# Patient Record
Sex: FEMALE | Race: WHITE | NOT HISPANIC OR LATINO | Employment: OTHER | URBAN - METROPOLITAN AREA
[De-identification: names, ages, dates, MRNs, and addresses within clinical notes are randomized per-mention and may not be internally consistent; named-entity substitution may affect disease eponyms.]

---

## 2019-02-13 ENCOUNTER — HOSPITAL ENCOUNTER (INPATIENT)
Facility: HOSPITAL | Age: 79
LOS: 3 days | Discharge: HOME | DRG: 920 | End: 2019-02-16
Attending: EMERGENCY MEDICINE | Admitting: HOSPITALIST
Payer: MEDICARE

## 2019-02-13 DIAGNOSIS — I48.91 ATRIAL FIBRILLATION: ICD-10-CM

## 2019-02-13 DIAGNOSIS — K92.2 GI BLEED: ICD-10-CM

## 2019-02-13 DIAGNOSIS — K92.2 ACUTE LOWER GI BLEEDING: Primary | ICD-10-CM

## 2019-02-13 DIAGNOSIS — K92.2 LOWER GI BLEED: ICD-10-CM

## 2019-02-13 PROBLEM — I25.10 CAD (CORONARY ARTERY DISEASE): Status: ACTIVE | Noted: 2019-02-13

## 2019-02-13 LAB
ABO + RH BLD: NORMAL
ALBUMIN SERPL BCP-MCNC: 3.8 G/DL
ALP SERPL-CCNC: 60 U/L
ALT SERPL W/O P-5'-P-CCNC: 19 U/L
ANION GAP SERPL CALC-SCNC: 14 MMOL/L
APTT BLDCRRT: 21.9 SEC
AST SERPL-CCNC: 39 U/L
BASOPHILS # BLD AUTO: 0.01 K/UL
BASOPHILS NFR BLD: 0.1 %
BILIRUB SERPL-MCNC: 0.4 MG/DL
BLD GP AB SCN CELLS X3 SERPL QL: NORMAL
BLD PROD TYP BPU: NORMAL
BLOOD UNIT EXPIRATION DATE: NORMAL
BLOOD UNIT TYPE CODE: 6200
BLOOD UNIT TYPE: NORMAL
BUN SERPL-MCNC: 20 MG/DL
CALCIUM SERPL-MCNC: 9.4 MG/DL
CHLORIDE SERPL-SCNC: 106 MMOL/L
CO2 SERPL-SCNC: 20 MMOL/L
CODING SYSTEM: NORMAL
CREAT SERPL-MCNC: 1 MG/DL
DIFFERENTIAL METHOD: ABNORMAL
DISPENSE STATUS: NORMAL
EOSINOPHIL # BLD AUTO: 0.2 K/UL
EOSINOPHIL NFR BLD: 3 %
ERYTHROCYTE [DISTWIDTH] IN BLOOD BY AUTOMATED COUNT: 12.5 %
EST. GFR  (AFRICAN AMERICAN): >60 ML/MIN/1.73 M^2
EST. GFR  (NON AFRICAN AMERICAN): 55 ML/MIN/1.73 M^2
GLUCOSE SERPL-MCNC: 154 MG/DL
HCT VFR BLD AUTO: 25.1 %
HCT VFR BLD AUTO: 25.1 %
HCT VFR BLD AUTO: 33.3 %
HGB BLD-MCNC: 11 G/DL
HGB BLD-MCNC: 8.3 G/DL
INR PPP: 1
LIPASE SERPL-CCNC: 32 U/L
LYMPHOCYTES # BLD AUTO: 3 K/UL
LYMPHOCYTES NFR BLD: 45 %
MCH RBC QN AUTO: 31.2 PG
MCHC RBC AUTO-ENTMCNC: 33 G/DL
MCV RBC AUTO: 94 FL
MONOCYTES # BLD AUTO: 0.5 K/UL
MONOCYTES NFR BLD: 7.7 %
NEUTROPHILS # BLD AUTO: 3 K/UL
NEUTROPHILS NFR BLD: 44.2 %
PLATELET # BLD AUTO: 185 K/UL
PMV BLD AUTO: 9.7 FL
POTASSIUM SERPL-SCNC: 4.4 MMOL/L
PROT SERPL-MCNC: 6.3 G/DL
PROTHROMBIN TIME: 11 SEC
RBC # BLD AUTO: 3.53 M/UL
SODIUM SERPL-SCNC: 140 MMOL/L
TRANS PLATPHERESIS VOL PATIENT: NORMAL ML
WBC # BLD AUTO: 6.73 K/UL

## 2019-02-13 PROCEDURE — P9035 PLATELET PHERES LEUKOREDUCED: HCPCS

## 2019-02-13 PROCEDURE — 80053 COMPREHEN METABOLIC PANEL: CPT

## 2019-02-13 PROCEDURE — 36430 TRANSFUSION BLD/BLD COMPNT: CPT

## 2019-02-13 PROCEDURE — 86901 BLOOD TYPING SEROLOGIC RH(D): CPT

## 2019-02-13 PROCEDURE — 99291 CRITICAL CARE FIRST HOUR: CPT | Mod: 25

## 2019-02-13 PROCEDURE — 85014 HEMATOCRIT: CPT

## 2019-02-13 PROCEDURE — 96374 THER/PROPH/DIAG INJ IV PUSH: CPT

## 2019-02-13 PROCEDURE — 63600175 PHARM REV CODE 636 W HCPCS: Performed by: EMERGENCY MEDICINE

## 2019-02-13 PROCEDURE — 96361 HYDRATE IV INFUSION ADD-ON: CPT

## 2019-02-13 PROCEDURE — P9016 RBC LEUKOCYTES REDUCED: HCPCS

## 2019-02-13 PROCEDURE — 83690 ASSAY OF LIPASE: CPT

## 2019-02-13 PROCEDURE — 36415 COLL VENOUS BLD VENIPUNCTURE: CPT

## 2019-02-13 PROCEDURE — 99223 1ST HOSP IP/OBS HIGH 75: CPT | Mod: ,,, | Performed by: INTERNAL MEDICINE

## 2019-02-13 PROCEDURE — 93005 ELECTROCARDIOGRAM TRACING: CPT

## 2019-02-13 PROCEDURE — 85025 COMPLETE CBC W/AUTO DIFF WBC: CPT

## 2019-02-13 PROCEDURE — 86920 COMPATIBILITY TEST SPIN: CPT

## 2019-02-13 PROCEDURE — 96375 TX/PRO/DX INJ NEW DRUG ADDON: CPT

## 2019-02-13 PROCEDURE — C9113 INJ PANTOPRAZOLE SODIUM, VIA: HCPCS | Performed by: EMERGENCY MEDICINE

## 2019-02-13 PROCEDURE — 93010 EKG 12-LEAD: ICD-10-PCS | Mod: ,,, | Performed by: INTERNAL MEDICINE

## 2019-02-13 PROCEDURE — 85730 THROMBOPLASTIN TIME PARTIAL: CPT

## 2019-02-13 PROCEDURE — 85610 PROTHROMBIN TIME: CPT

## 2019-02-13 PROCEDURE — 85018 HEMOGLOBIN: CPT

## 2019-02-13 PROCEDURE — 99223 PR INITIAL HOSPITAL CARE,LEVL III: ICD-10-PCS | Mod: ,,, | Performed by: INTERNAL MEDICINE

## 2019-02-13 PROCEDURE — 11000001 HC ACUTE MED/SURG PRIVATE ROOM

## 2019-02-13 PROCEDURE — 25000003 PHARM REV CODE 250: Performed by: EMERGENCY MEDICINE

## 2019-02-13 PROCEDURE — 93010 ELECTROCARDIOGRAM REPORT: CPT | Mod: ,,, | Performed by: INTERNAL MEDICINE

## 2019-02-13 RX ORDER — PANTOPRAZOLE SODIUM 40 MG/10ML
80 INJECTION, POWDER, LYOPHILIZED, FOR SOLUTION INTRAVENOUS
Status: COMPLETED | OUTPATIENT
Start: 2019-02-13 | End: 2019-02-13

## 2019-02-13 RX ORDER — ONDANSETRON 2 MG/ML
4 INJECTION INTRAMUSCULAR; INTRAVENOUS
Status: COMPLETED | OUTPATIENT
Start: 2019-02-13 | End: 2019-02-13

## 2019-02-13 RX ORDER — PANTOPRAZOLE SODIUM 40 MG/10ML
40 INJECTION, POWDER, LYOPHILIZED, FOR SOLUTION INTRAVENOUS DAILY
Status: DISCONTINUED | OUTPATIENT
Start: 2019-02-14 | End: 2019-02-16 | Stop reason: HOSPADM

## 2019-02-13 RX ORDER — POLYETHYLENE GLYCOL 3350, SODIUM SULFATE ANHYDROUS, SODIUM BICARBONATE, SODIUM CHLORIDE, POTASSIUM CHLORIDE 236; 22.74; 6.74; 5.86; 2.97 G/4L; G/4L; G/4L; G/4L; G/4L
4000 POWDER, FOR SOLUTION ORAL ONCE
Status: COMPLETED | OUTPATIENT
Start: 2019-02-13 | End: 2019-02-14

## 2019-02-13 RX ORDER — SODIUM CHLORIDE 9 MG/ML
INJECTION, SOLUTION INTRAVENOUS CONTINUOUS
Status: DISCONTINUED | OUTPATIENT
Start: 2019-02-13 | End: 2019-02-15

## 2019-02-13 RX ORDER — SODIUM CHLORIDE 0.9 % (FLUSH) 0.9 %
3 SYRINGE (ML) INJECTION
Status: DISCONTINUED | OUTPATIENT
Start: 2019-02-13 | End: 2019-02-16 | Stop reason: HOSPADM

## 2019-02-13 RX ORDER — ACETAMINOPHEN 325 MG/1
650 TABLET ORAL EVERY 8 HOURS PRN
Status: DISCONTINUED | OUTPATIENT
Start: 2019-02-13 | End: 2019-02-15

## 2019-02-13 RX ORDER — MORPHINE SULFATE 10 MG/ML
4 INJECTION INTRAMUSCULAR; INTRAVENOUS; SUBCUTANEOUS
Status: COMPLETED | OUTPATIENT
Start: 2019-02-13 | End: 2019-02-13

## 2019-02-13 RX ORDER — ASPIRIN 81 MG/1
81 TABLET ORAL DAILY
COMMUNITY

## 2019-02-13 RX ORDER — ONDANSETRON 2 MG/ML
4 INJECTION INTRAMUSCULAR; INTRAVENOUS
Status: COMPLETED | OUTPATIENT
Start: 2019-02-13 | End: 2019-02-14

## 2019-02-13 RX ORDER — PROCHLORPERAZINE EDISYLATE 5 MG/ML
5 INJECTION INTRAMUSCULAR; INTRAVENOUS EVERY 6 HOURS PRN
Status: DISCONTINUED | OUTPATIENT
Start: 2019-02-13 | End: 2019-02-13 | Stop reason: SDUPTHER

## 2019-02-13 RX ORDER — METOPROLOL TARTRATE 100 MG/1
50 TABLET ORAL 2 TIMES DAILY
COMMUNITY

## 2019-02-13 RX ORDER — PANTOPRAZOLE SODIUM 40 MG/10ML
40 INJECTION, POWDER, LYOPHILIZED, FOR SOLUTION INTRAVENOUS DAILY
Status: DISCONTINUED | OUTPATIENT
Start: 2019-02-14 | End: 2019-02-13

## 2019-02-13 RX ORDER — HYDROCODONE BITARTRATE AND ACETAMINOPHEN 500; 5 MG/1; MG/1
TABLET ORAL
Status: DISCONTINUED | OUTPATIENT
Start: 2019-02-13 | End: 2019-02-14

## 2019-02-13 RX ORDER — ROSUVASTATIN CALCIUM 20 MG/1
20 TABLET, COATED ORAL DAILY
COMMUNITY

## 2019-02-13 RX ORDER — ONDANSETRON 2 MG/ML
4 INJECTION INTRAMUSCULAR; INTRAVENOUS EVERY 8 HOURS PRN
Status: DISCONTINUED | OUTPATIENT
Start: 2019-02-13 | End: 2019-02-16 | Stop reason: HOSPADM

## 2019-02-13 RX ORDER — ONDANSETRON 2 MG/ML
4 INJECTION INTRAMUSCULAR; INTRAVENOUS EVERY 12 HOURS PRN
Status: DISCONTINUED | OUTPATIENT
Start: 2019-02-13 | End: 2019-02-13 | Stop reason: SDUPTHER

## 2019-02-13 RX ORDER — SODIUM CHLORIDE 0.9 % (FLUSH) 0.9 %
5 SYRINGE (ML) INJECTION
Status: DISCONTINUED | OUTPATIENT
Start: 2019-02-13 | End: 2019-02-16 | Stop reason: HOSPADM

## 2019-02-13 RX ORDER — CLOPIDOGREL BISULFATE 75 MG/1
75 TABLET ORAL ONCE
Status: ON HOLD | COMMUNITY
End: 2019-02-16 | Stop reason: HOSPADM

## 2019-02-13 RX ADMIN — PANTOPRAZOLE SODIUM 80 MG: 40 INJECTION, POWDER, LYOPHILIZED, FOR SOLUTION INTRAVENOUS at 05:02

## 2019-02-13 RX ADMIN — SODIUM CHLORIDE: 0.9 INJECTION, SOLUTION INTRAVENOUS at 08:02

## 2019-02-13 RX ADMIN — MORPHINE SULFATE 4 MG: 10 INJECTION INTRAVENOUS at 07:02

## 2019-02-13 RX ADMIN — ONDANSETRON 4 MG: 2 INJECTION INTRAMUSCULAR; INTRAVENOUS at 07:02

## 2019-02-13 RX ADMIN — SODIUM CHLORIDE 1000 ML: 0.9 INJECTION, SOLUTION INTRAVENOUS at 05:02

## 2019-02-13 NOTE — ED PROVIDER NOTES
SCRIBE #1 NOTE: I, Crys Alanis, am scribing for, and in the presence of, Abdoul Rodriges Jr., MD. I have scribed the entire note.       History     Chief Complaint   Patient presents with    GI Bleeding     onset 2hours     Review of patient's allergies indicates:  Allergies not on file      History of Present Illness     HPI    2/13/2019, 5:32 PM  History obtained from the patient      History of Present Illness: Tanesha Beard is a 78 y.o. female patient with a PMHx of HTN and HLD who presents to the Emergency Department for evaluation of GI bleed which onset 2 hrs PTA. Symptoms are constant and moderate in severity.  No mitigating or exacerbating factors reported. Patient denies any fever, chills, N/V, abd pain, diarrhea, constipation, and all other sxs at this time. Pt had a colonoscopy by general surgery last week. Pt takes ASA and Plavix daily. No further complaints or concerns at this time.     Arrival mode: AASI    PCP: Primary Doctor No        Past Medical History:  Past Medical History:   Diagnosis Date    CAD (coronary artery disease)     DVT (deep venous thrombosis)     GI bleed     PE (pulmonary thromboembolism)        Past Surgical History:  History reviewed. No pertinent past surgical history.      Family History:  History reviewed. No pertinent family history.      Social History:  Social History     Tobacco Use    Smoking status: Unknown   Substance and Sexual Activity    Alcohol use: No     Frequency: Never    Drug use: No    Sexual activity: Unknown        Review of Systems     Review of Systems   Constitutional: Negative for chills and fever.   HENT: Negative for sore throat.    Respiratory: Negative for shortness of breath.    Cardiovascular: Negative for chest pain.   Gastrointestinal: Positive for anal bleeding. Negative for abdominal pain, constipation, diarrhea, nausea and vomiting.   Genitourinary: Negative for dysuria.   Musculoskeletal: Negative for back pain.   Skin:  Negative for rash.   Neurological: Negative for weakness.   Hematological: Does not bruise/bleed easily.   All other systems reviewed and are negative.       Physical Exam     Initial Vitals [02/13/19 1735]   BP Pulse Resp Temp SpO2   (!) 156/75 86 20 97.6 °F (36.4 °C) 100 %      MAP       --          Physical Exam  Nursing Notes and Vital Signs Reviewed.  Constitutional: Patient is in no acute distress. Well-developed and well-nourished.  Head: Atraumatic. Normocephalic.  Eyes: PERRL. EOM intact. Conjunctivae are not pale. No scleral icterus.  ENT: Mucous membranes are moist. Oropharynx is clear and symmetric.    Neck: Supple. Full ROM. No lymphadenopathy.  Cardiovascular: Regular rate. Regular rhythm. No murmurs, rubs, or gallops. Distal pulses are 2+ and symmetric.  Pulmonary/Chest: No respiratory distress. Clear to auscultation bilaterally. No wheezing or rales.  Abdominal: Soft and non-distended.  There is no tenderness. No rebound, guarding, or rigidity. Good bowel sounds.  Genitourinary: No CVA tenderness  Rectal:  Pt is covered in blood. Actively bleeding with copius blood.  Musculoskeletal: Moves all extremities. No obvious deformities. No edema. No calf tenderness.  Skin: Warm and dry.  Neurological:  Alert, awake, and appropriate.  Normal speech.  No acute focal neurological deficits are appreciated.  Psychiatric: Normal affect. Good eye contact. Appropriate in content.     ED Course   Critical Care  Date/Time: 2/13/2019 6:06 PM  Performed by: Abdoul Rodriges Jr., MD  Authorized by: Abdoul Rodriges Jr., MD   Direct patient critical care time: 30 minutes  Additional history critical care time: 6 minutes  Ordering / reviewing critical care time: 6 minutes  Documentation critical care time: 6 minutes  Consulting other physicians critical care time: 6 minutes  Consult with family critical care time: 6 minutes  Total critical care time (exclusive of procedural time) : 60 minutes  Critical care time was  exclusive of separately billable procedures and treating other patients and teaching time.  Critical care was necessary to treat or prevent imminent or life-threatening deterioration of the following conditions: GI bleed.  Critical care was time spent personally by me on the following activities: blood draw for specimens, development of treatment plan with patient or surrogate, discussions with consultants, interpretation of cardiac output measurements, evaluation of patient's response to treatment, examination of patient, ordering and review of laboratory studies, ordering and performing treatments and interventions, obtaining history from patient or surrogate, re-evaluation of patient's condition, review of old charts and ordering and review of radiographic studies.        ED Vital Signs:  Vitals:    02/13/19 1735 02/13/19 1737 02/13/19 1802 02/13/19 1817   BP: (!) 156/75 (!) 156/75 (!) 142/79 131/82   Pulse: 86 86 83 80   Resp: 20 19 18 17   Temp: 97.6 °F (36.4 °C)      TempSrc:       SpO2: 100% 100% 97% 98%    02/13/19 1847 02/13/19 1849 02/13/19 1900 02/13/19 1910   BP: 133/75  133/75 130/70   Pulse:  80 72 72   Resp:  20     Temp:   98.2 °F (36.8 °C) 98.1 °F (36.7 °C)   TempSrc:    Oral   SpO2: 99% 98% 100% 100%       Abnormal Lab Results:  Labs Reviewed   CBC W/ AUTO DIFFERENTIAL - Abnormal; Notable for the following components:       Result Value    RBC 3.53 (*)     Hemoglobin 11.0 (*)     Hematocrit 33.3 (*)     MCH 31.2 (*)     All other components within normal limits   COMPREHENSIVE METABOLIC PANEL - Abnormal; Notable for the following components:    CO2 20 (*)     Glucose 154 (*)     eGFR if non  55 (*)     All other components within normal limits   LIPASE   PROTIME-INR   APTT   HEMOGLOBIN   HEMATOCRIT   TYPE & SCREEN   PREPARE PLATELETS (RANDOM UNITS) SOFT        All Lab Results:  Results for orders placed or performed during the hospital encounter of 02/13/19   CBC auto differential    Result Value Ref Range    WBC 6.73 3.90 - 12.70 K/uL    RBC 3.53 (L) 4.00 - 5.40 M/uL    Hemoglobin 11.0 (L) 12.0 - 16.0 g/dL    Hematocrit 33.3 (L) 37.0 - 48.5 %    MCV 94 82 - 98 fL    MCH 31.2 (H) 27.0 - 31.0 pg    MCHC 33.0 32.0 - 36.0 g/dL    RDW 12.5 11.5 - 14.5 %    Platelets 185 150 - 350 K/uL    MPV 9.7 9.2 - 12.9 fL    Gran # (ANC) 3.0 1.8 - 7.7 K/uL    Lymph # 3.0 1.0 - 4.8 K/uL    Mono # 0.5 0.3 - 1.0 K/uL    Eos # 0.2 0.0 - 0.5 K/uL    Baso # 0.01 0.00 - 0.20 K/uL    Gran% 44.2 38.0 - 73.0 %    Lymph% 45.0 18.0 - 48.0 %    Mono% 7.7 4.0 - 15.0 %    Eosinophil% 3.0 0.0 - 8.0 %    Basophil% 0.1 0.0 - 1.9 %    Differential Method Automated    Comprehensive metabolic panel   Result Value Ref Range    Sodium 140 136 - 145 mmol/L    Potassium 4.4 3.5 - 5.1 mmol/L    Chloride 106 95 - 110 mmol/L    CO2 20 (L) 23 - 29 mmol/L    Glucose 154 (H) 70 - 110 mg/dL    BUN, Bld 20 8 - 23 mg/dL    Creatinine 1.0 0.5 - 1.4 mg/dL    Calcium 9.4 8.7 - 10.5 mg/dL    Total Protein 6.3 6.0 - 8.4 g/dL    Albumin 3.8 3.5 - 5.2 g/dL    Total Bilirubin 0.4 0.1 - 1.0 mg/dL    Alkaline Phosphatase 60 55 - 135 U/L    AST 39 10 - 40 U/L    ALT 19 10 - 44 U/L    Anion Gap 14 8 - 16 mmol/L    eGFR if African American >60 >60 mL/min/1.73 m^2    eGFR if non African American 55 (A) >60 mL/min/1.73 m^2   Lipase   Result Value Ref Range    Lipase 32 4 - 60 U/L   Protime-INR   Result Value Ref Range    Prothrombin Time 11.0 9.0 - 12.5 sec    INR 1.0 0.8 - 1.2   APTT   Result Value Ref Range    aPTT 21.9 21.0 - 32.0 sec   Type & Screen   Result Value Ref Range    Group & Rh O POS     Indirect Makenzie NEG    Prepare Platelets 3 Units (Pooled)   Result Value Ref Range    UNIT NUMBER [id number]     PRODUCT CODE H6571D42     DISPENSE STATUS ISSUED     CODING SYSTEM KMVW928     Unit Blood Type Code 6200     Unit Blood Type A POS     Unit Expiration [id number]     UNIT NUMBER [id number]     PRODUCT CODE S1504V41     DISPENSE STATUS  CROSSMATCHED     CODING SYSTEM KWLQ304     Unit Blood Type Code 6200     Unit Blood Type A POS     Unit Expiration [id number]     UNIT NUMBER [id number]     PRODUCT CODE Y8846R85     DISPENSE STATUS CROSSMATCHED     CODING SYSTEM OBPN185     Unit Blood Type Code 6200     Unit Blood Type A POS     Unit Expiration [id number]          Imaging Results:  Imaging Results          X-Ray Chest AP Portable (In process)                  The EKG was ordered, reviewed, and independently interpreted by the ED provider.  Interpretation time: 1933  Rate: 74 BPM  Rhythm: normal sinus rhythm  Interpretation: Prolonged QT. No STEMI.           The Emergency Provider reviewed the vital signs and test results, which are outlined above.     ED Discussion       5:48 PM: This evaluated the patient.  Assessment ample time at the bedside as well as discussing the case with multiple providers.  Her surgeon Lazaro Skaggs (cell:  [phone]) called prior to arrival.  She underwent a colonoscopy on February 8th.  He removed for polyps via the hot technique at that time.  She had no diverticulitis, no mass, no diverticuli that the ascertained on her scope.  Subsequently spoke with her son who is also a physician named Martínez Beard.  Family is aware of her current condition as well as the plan of care.  I am waiting to hear back from GI.    5:49 PM: Dr. Rodriges discussed the pt's case with Dr. Cheney (GI) who recommends giving platelets. She states she will come evaluate pt.     7:00 PM: Dr. Cheney requests to admit pt to hospital medicine.  Requests admission to ICU with q4hr H&H. Requested transfusion of platelets to see if that would stop the bleeding. She request if there is further bleeding or H&H continue to drop, consult Nataly.     7:24 PM: I called her surgeon  with follow-up on the patient.  He is aware.  He asked me to stress that the biopsy was a hot biopsy polypectomy and not a biopsy.    7:26 PM: Discussed case  with Dr. Garrett ([facility]) who requests EKG and CXR. Dr. Garrett agrees with current care and management of pt and accepts admission.   Admitting Service: [facility]   Admitting Physician: Dr. Garrett  Admit to: ICU    7:30 PM: Re-evaluated pt. I have discussed test results, shared treatment plan, and the need for admission with patient and family at bedside. Pt and family express understanding at this time and agree with all information. All questions answered. Pt and family have no further questions or concerns at this time. Pt is ready for admit.    ED Medication(s):  Medications   0.9%  NaCl infusion (for blood administration) (not administered)   sodium chloride 0.9% flush 3 mL (not administered)   ondansetron injection 4 mg (not administered)   prochlorperazine injection Soln 5 mg (not administered)   pantoprazole injection 40 mg (not administered)   sodium chloride 0.9% bolus 1,000 mL (1,000 mLs Intravenous New Bag 2/13/19 1742)   pantoprazole injection 80 mg (80 mg Intravenous Given 2/13/19 1742)   morphine injection 4 mg (4 mg Intravenous Given 2/13/19 1920)   ondansetron injection 4 mg (4 mg Intravenous Given 2/13/19 1919)       New Prescriptions    No medications on file                 Medical Decision Making:   Clinical Tests:   Lab Tests: Ordered and Reviewed  Radiological Study: Ordered  Medical Tests: Ordered and Reviewed             Scribe Attestation:   Scribe #1: I performed the above scribed service and the documentation accurately describes the services I performed. I attest to the accuracy of the note.     Attending:   Physician Attestation Statement for Scribe #1: I, Abdoul Rodriges Jr., MD, personally performed the services described in this documentation, as scribed by Crys Alanis, in my presence, and it is both accurate and complete.           Clinical Impression       ICD-10-CM ICD-9-CM   1. Acute lower GI bleeding K92.2 578.9   2. GI bleed K92.2 578.9       Disposition:    Disposition: Admitted  Condition: Serious         Abdoul Rodriges Jr., MD  02/13/19 2019

## 2019-02-14 ENCOUNTER — ANESTHESIA EVENT (OUTPATIENT)
Dept: ENDOSCOPY | Facility: HOSPITAL | Age: 79
DRG: 920 | End: 2019-02-14
Payer: MEDICARE

## 2019-02-14 ENCOUNTER — ANESTHESIA (OUTPATIENT)
Dept: ENDOSCOPY | Facility: HOSPITAL | Age: 79
DRG: 920 | End: 2019-02-14
Payer: MEDICARE

## 2019-02-14 PROBLEM — D62 ACUTE BLOOD LOSS ANEMIA: Status: ACTIVE | Noted: 2019-02-14

## 2019-02-14 PROBLEM — R73.9 ACUTE HYPERGLYCEMIA: Status: ACTIVE | Noted: 2019-02-14

## 2019-02-14 LAB
ANION GAP SERPL CALC-SCNC: 10 MMOL/L
ANION GAP SERPL CALC-SCNC: 7 MMOL/L
BASOPHILS # BLD AUTO: 0.03 K/UL
BASOPHILS NFR BLD: 0.5 %
BLD PROD TYP BPU: NORMAL
BLOOD UNIT EXPIRATION DATE: NORMAL
BLOOD UNIT TYPE CODE: 5100
BLOOD UNIT TYPE: NORMAL
BUN SERPL-MCNC: 12 MG/DL
BUN SERPL-MCNC: 9 MG/DL
CALCIUM SERPL-MCNC: 7.4 MG/DL
CALCIUM SERPL-MCNC: 7.6 MG/DL
CHLORIDE SERPL-SCNC: 113 MMOL/L
CHLORIDE SERPL-SCNC: 114 MMOL/L
CO2 SERPL-SCNC: 21 MMOL/L
CO2 SERPL-SCNC: 21 MMOL/L
CODING SYSTEM: NORMAL
CREAT SERPL-MCNC: 0.7 MG/DL
CREAT SERPL-MCNC: 0.8 MG/DL
DIFFERENTIAL METHOD: ABNORMAL
DISPENSE STATUS: NORMAL
EOSINOPHIL # BLD AUTO: 0.1 K/UL
EOSINOPHIL NFR BLD: 1.1 %
ERYTHROCYTE [DISTWIDTH] IN BLOOD BY AUTOMATED COUNT: 15.5 %
EST. GFR  (AFRICAN AMERICAN): >60 ML/MIN/1.73 M^2
EST. GFR  (AFRICAN AMERICAN): >60 ML/MIN/1.73 M^2
EST. GFR  (NON AFRICAN AMERICAN): >60 ML/MIN/1.73 M^2
EST. GFR  (NON AFRICAN AMERICAN): >60 ML/MIN/1.73 M^2
ESTIMATED AVG GLUCOSE: 105 MG/DL
GLUCOSE SERPL-MCNC: 100 MG/DL
GLUCOSE SERPL-MCNC: 99 MG/DL
HBA1C MFR BLD HPLC: 5.3 %
HCT VFR BLD AUTO: 22.5 %
HCT VFR BLD AUTO: 23.3 %
HCT VFR BLD AUTO: 23.3 %
HCT VFR BLD AUTO: 24.7 %
HCT VFR BLD AUTO: 27.4 %
HCT VFR BLD AUTO: 29 %
HGB BLD-MCNC: 7.5 G/DL
HGB BLD-MCNC: 7.9 G/DL
HGB BLD-MCNC: 7.9 G/DL
HGB BLD-MCNC: 8.1 G/DL
HGB BLD-MCNC: 9.3 G/DL
HGB BLD-MCNC: 9.7 G/DL
LYMPHOCYTES # BLD AUTO: 1.2 K/UL
LYMPHOCYTES NFR BLD: 21.9 %
MAGNESIUM SERPL-MCNC: 1.2 MG/DL
MCH RBC QN AUTO: 29.5 PG
MCHC RBC AUTO-ENTMCNC: 33.9 G/DL
MCV RBC AUTO: 87 FL
MONOCYTES # BLD AUTO: 0.8 K/UL
MONOCYTES NFR BLD: 15.1 %
NEUTROPHILS # BLD AUTO: 3.4 K/UL
NEUTROPHILS NFR BLD: 61.4 %
NUM UNITS TRANS PACKED RBC: NORMAL
PLATELET # BLD AUTO: 102 K/UL
PMV BLD AUTO: 9.2 FL
POCT GLUCOSE: 100 MG/DL (ref 70–110)
POTASSIUM SERPL-SCNC: 3.1 MMOL/L
POTASSIUM SERPL-SCNC: 3.7 MMOL/L
RBC # BLD AUTO: 2.68 M/UL
SODIUM SERPL-SCNC: 141 MMOL/L
SODIUM SERPL-SCNC: 145 MMOL/L
UNIT NUMBER: NORMAL
WBC # BLD AUTO: 5.48 K/UL

## 2019-02-14 PROCEDURE — 36430 TRANSFUSION BLD/BLD COMPNT: CPT

## 2019-02-14 PROCEDURE — 25000003 PHARM REV CODE 250: Performed by: NURSE ANESTHETIST, CERTIFIED REGISTERED

## 2019-02-14 PROCEDURE — P9016 RBC LEUKOCYTES REDUCED: HCPCS

## 2019-02-14 PROCEDURE — 63600175 PHARM REV CODE 636 W HCPCS: Performed by: INTERNAL MEDICINE

## 2019-02-14 PROCEDURE — 45382 COLONOSCOPY W/CONTROL BLEED: CPT | Performed by: INTERNAL MEDICINE

## 2019-02-14 PROCEDURE — 83735 ASSAY OF MAGNESIUM: CPT

## 2019-02-14 PROCEDURE — 85014 HEMATOCRIT: CPT | Mod: 91

## 2019-02-14 PROCEDURE — 27201038 HC PROBE, BI-POLAR: Performed by: INTERNAL MEDICINE

## 2019-02-14 PROCEDURE — 45382 PR COLONOSCOPY,FLEX,W/CONTROL, BLEEDING: ICD-10-PCS | Mod: ,,, | Performed by: INTERNAL MEDICINE

## 2019-02-14 PROCEDURE — 80048 BASIC METABOLIC PNL TOTAL CA: CPT | Mod: 91

## 2019-02-14 PROCEDURE — 27201028 HC NEEDLE, SCLERO: Performed by: INTERNAL MEDICINE

## 2019-02-14 PROCEDURE — 36415 COLL VENOUS BLD VENIPUNCTURE: CPT

## 2019-02-14 PROCEDURE — 94640 AIRWAY INHALATION TREATMENT: CPT

## 2019-02-14 PROCEDURE — 63600175 PHARM REV CODE 636 W HCPCS: Performed by: HOSPITALIST

## 2019-02-14 PROCEDURE — 63600175 PHARM REV CODE 636 W HCPCS: Performed by: EMERGENCY MEDICINE

## 2019-02-14 PROCEDURE — 93010 EKG 12-LEAD: ICD-10-PCS | Mod: ,,, | Performed by: INTERNAL MEDICINE

## 2019-02-14 PROCEDURE — 96375 TX/PRO/DX INJ NEW DRUG ADDON: CPT

## 2019-02-14 PROCEDURE — 85014 HEMATOCRIT: CPT

## 2019-02-14 PROCEDURE — 80048 BASIC METABOLIC PNL TOTAL CA: CPT

## 2019-02-14 PROCEDURE — 25000003 PHARM REV CODE 250: Performed by: NURSE PRACTITIONER

## 2019-02-14 PROCEDURE — C9113 INJ PANTOPRAZOLE SODIUM, VIA: HCPCS | Performed by: EMERGENCY MEDICINE

## 2019-02-14 PROCEDURE — 83036 HEMOGLOBIN GLYCOSYLATED A1C: CPT

## 2019-02-14 PROCEDURE — 85025 COMPLETE CBC W/AUTO DIFF WBC: CPT

## 2019-02-14 PROCEDURE — P9012 CRYOPRECIPITATE EACH UNIT: HCPCS

## 2019-02-14 PROCEDURE — 99291 CRITICAL CARE FIRST HOUR: CPT | Mod: ,,, | Performed by: NURSE PRACTITIONER

## 2019-02-14 PROCEDURE — 25000003 PHARM REV CODE 250: Performed by: INTERNAL MEDICINE

## 2019-02-14 PROCEDURE — 20000000 HC ICU ROOM

## 2019-02-14 PROCEDURE — 85018 HEMOGLOBIN: CPT

## 2019-02-14 PROCEDURE — 63600175 PHARM REV CODE 636 W HCPCS: Performed by: NURSE ANESTHETIST, CERTIFIED REGISTERED

## 2019-02-14 PROCEDURE — 25000003 PHARM REV CODE 250: Performed by: HOSPITALIST

## 2019-02-14 PROCEDURE — 85018 HEMOGLOBIN: CPT | Mod: 91

## 2019-02-14 PROCEDURE — 25000242 PHARM REV CODE 250 ALT 637 W/ HCPCS: Performed by: HOSPITALIST

## 2019-02-14 PROCEDURE — 99291 PR CRITICAL CARE, E/M 30-74 MINUTES: ICD-10-PCS | Mod: ,,, | Performed by: NURSE PRACTITIONER

## 2019-02-14 PROCEDURE — 45382 COLONOSCOPY W/CONTROL BLEED: CPT | Mod: ,,, | Performed by: INTERNAL MEDICINE

## 2019-02-14 PROCEDURE — 27000221 HC OXYGEN, UP TO 24 HOURS

## 2019-02-14 PROCEDURE — 37000008 HC ANESTHESIA 1ST 15 MINUTES: Performed by: INTERNAL MEDICINE

## 2019-02-14 PROCEDURE — 93010 ELECTROCARDIOGRAM REPORT: CPT | Mod: ,,, | Performed by: INTERNAL MEDICINE

## 2019-02-14 PROCEDURE — 27200997: Performed by: INTERNAL MEDICINE

## 2019-02-14 PROCEDURE — 84484 ASSAY OF TROPONIN QUANT: CPT

## 2019-02-14 PROCEDURE — 37000009 HC ANESTHESIA EA ADD 15 MINS: Performed by: INTERNAL MEDICINE

## 2019-02-14 RX ORDER — PHENYLEPHRINE HYDROCHLORIDE 10 MG/ML
INJECTION INTRAVENOUS
Status: DISCONTINUED | OUTPATIENT
Start: 2019-02-14 | End: 2019-02-14

## 2019-02-14 RX ORDER — HYDROCODONE BITARTRATE AND ACETAMINOPHEN 500; 5 MG/1; MG/1
TABLET ORAL
Status: DISCONTINUED | OUTPATIENT
Start: 2019-02-14 | End: 2019-02-15

## 2019-02-14 RX ORDER — PROPOFOL 10 MG/ML
INJECTION, EMULSION INTRAVENOUS
Status: COMPLETED
Start: 2019-02-14 | End: 2019-02-14

## 2019-02-14 RX ORDER — DILTIAZEM HYDROCHLORIDE 5 MG/ML
0.16 INJECTION INTRAVENOUS ONCE
Status: COMPLETED | OUTPATIENT
Start: 2019-02-14 | End: 2019-02-14

## 2019-02-14 RX ORDER — NOREPINEPHRINE BITARTRATE/D5W 4MG/250ML
0.02 PLASTIC BAG, INJECTION (ML) INTRAVENOUS CONTINUOUS
Status: DISCONTINUED | OUTPATIENT
Start: 2019-02-14 | End: 2019-02-14

## 2019-02-14 RX ORDER — EPINEPHRINE CONVENIENCE KIT 1 MG/ML(1)
1 KIT INTRAMUSCULAR; SUBCUTANEOUS ONCE
Status: COMPLETED | OUTPATIENT
Start: 2019-02-14 | End: 2019-02-14

## 2019-02-14 RX ORDER — PROPOFOL 10 MG/ML
VIAL (ML) INTRAVENOUS CONTINUOUS PRN
Status: DISCONTINUED | OUTPATIENT
Start: 2019-02-14 | End: 2019-02-14

## 2019-02-14 RX ORDER — MAGNESIUM SULFATE HEPTAHYDRATE 40 MG/ML
2 INJECTION, SOLUTION INTRAVENOUS
Status: COMPLETED | OUTPATIENT
Start: 2019-02-14 | End: 2019-02-15

## 2019-02-14 RX ORDER — IPRATROPIUM BROMIDE AND ALBUTEROL SULFATE 2.5; .5 MG/3ML; MG/3ML
3 SOLUTION RESPIRATORY (INHALATION) EVERY 4 HOURS
Status: DISCONTINUED | OUTPATIENT
Start: 2019-02-14 | End: 2019-02-15

## 2019-02-14 RX ORDER — METOPROLOL TARTRATE 1 MG/ML
2.5 INJECTION, SOLUTION INTRAVENOUS ONCE
Status: COMPLETED | OUTPATIENT
Start: 2019-02-14 | End: 2019-02-14

## 2019-02-14 RX ORDER — PROPOFOL 10 MG/ML
INJECTION, EMULSION INTRAVENOUS
Status: DISPENSED
Start: 2019-02-14 | End: 2019-02-14

## 2019-02-14 RX ORDER — SODIUM CHLORIDE, SODIUM LACTATE, POTASSIUM CHLORIDE, CALCIUM CHLORIDE 600; 310; 30; 20 MG/100ML; MG/100ML; MG/100ML; MG/100ML
INJECTION, SOLUTION INTRAVENOUS CONTINUOUS PRN
Status: DISCONTINUED | OUTPATIENT
Start: 2019-02-14 | End: 2019-02-14

## 2019-02-14 RX ORDER — POTASSIUM CHLORIDE 7.45 MG/ML
10 INJECTION INTRAVENOUS
Status: COMPLETED | OUTPATIENT
Start: 2019-02-14 | End: 2019-02-14

## 2019-02-14 RX ADMIN — PHENYLEPHRINE HYDROCHLORIDE 100 MCG: 10 INJECTION INTRAVENOUS at 04:02

## 2019-02-14 RX ADMIN — POTASSIUM CHLORIDE 10 MEQ: 7.46 INJECTION, SOLUTION INTRAVENOUS at 09:02

## 2019-02-14 RX ADMIN — IPRATROPIUM BROMIDE AND ALBUTEROL SULFATE 3 ML: .5; 3 SOLUTION RESPIRATORY (INHALATION) at 08:02

## 2019-02-14 RX ADMIN — PROPOFOL 150 MCG/KG/MIN: 10 INJECTION, EMULSION INTRAVENOUS at 04:02

## 2019-02-14 RX ADMIN — EPINEPHRINE 1 MG: 1 INJECTION, SOLUTION, CONCENTRATE INTRAVENOUS at 04:02

## 2019-02-14 RX ADMIN — SODIUM CHLORIDE, SODIUM LACTATE, POTASSIUM CHLORIDE, AND CALCIUM CHLORIDE 500 ML: .6; .31; .03; .02 INJECTION, SOLUTION INTRAVENOUS at 10:02

## 2019-02-14 RX ADMIN — MAGNESIUM SULFATE IN WATER 2 G: 40 INJECTION, SOLUTION INTRAVENOUS at 11:02

## 2019-02-14 RX ADMIN — PANTOPRAZOLE SODIUM 40 MG: 40 INJECTION, POWDER, LYOPHILIZED, FOR SOLUTION INTRAVENOUS at 08:02

## 2019-02-14 RX ADMIN — DILTIAZEM HYDROCHLORIDE 10 MG: 5 INJECTION INTRAVENOUS at 08:02

## 2019-02-14 RX ADMIN — POLYETHYLENE GLYCOL 3350, SODIUM SULFATE ANHYDROUS, SODIUM BICARBONATE, SODIUM CHLORIDE, POTASSIUM CHLORIDE 4000 ML: 236; 22.74; 6.74; 5.86; 2.97 POWDER, FOR SOLUTION ORAL at 01:02

## 2019-02-14 RX ADMIN — IPRATROPIUM BROMIDE AND ALBUTEROL SULFATE 3 ML: .5; 3 SOLUTION RESPIRATORY (INHALATION) at 04:02

## 2019-02-14 RX ADMIN — ONDANSETRON 4 MG: 2 INJECTION INTRAMUSCULAR; INTRAVENOUS at 12:02

## 2019-02-14 RX ADMIN — POTASSIUM CHLORIDE 10 MEQ: 7.46 INJECTION, SOLUTION INTRAVENOUS at 11:02

## 2019-02-14 RX ADMIN — MAGNESIUM SULFATE IN WATER 2 G: 40 INJECTION, SOLUTION INTRAVENOUS at 09:02

## 2019-02-14 RX ADMIN — METOPROLOL TARTRATE 2.5 MG: 5 INJECTION, SOLUTION INTRAVENOUS at 09:02

## 2019-02-14 RX ADMIN — IPRATROPIUM BROMIDE AND ALBUTEROL SULFATE 3 ML: .5; 3 SOLUTION RESPIRATORY (INHALATION) at 11:02

## 2019-02-14 RX ADMIN — SODIUM CHLORIDE 1000 ML: 0.9 INJECTION, SOLUTION INTRAVENOUS at 03:02

## 2019-02-14 RX ADMIN — SODIUM CHLORIDE, SODIUM LACTATE, POTASSIUM CHLORIDE, AND CALCIUM CHLORIDE: 600; 310; 30; 20 INJECTION, SOLUTION INTRAVENOUS at 04:02

## 2019-02-14 RX ADMIN — SODIUM CHLORIDE, SODIUM LACTATE, POTASSIUM CHLORIDE, AND CALCIUM CHLORIDE 500 ML: .6; .31; .03; .02 INJECTION, SOLUTION INTRAVENOUS at 06:02

## 2019-02-14 RX ADMIN — POTASSIUM CHLORIDE 10 MEQ: 7.46 INJECTION, SOLUTION INTRAVENOUS at 10:02

## 2019-02-14 NOTE — PLAN OF CARE
02/14/19 1659   Medicare Message   Important Message from Medicare regarding Discharge Appeal Rights Given to patient/caregiver;Explained to patient/caregiver;Signed/date by patient/caregiver   Date IMM was signed 02/14/19   Time IMM was signed [id number]

## 2019-02-14 NOTE — H&P
[facility] Hospital Medicine  History & Physical    Patient Name: Tanesha Beard  MRN: [id number]  Admission Date: 2/13/2019  Attending Physician: Abdoul Rodriges Jr., MD   Primary Care Provider: Primary Doctor No         Patient information was obtained from patient, relative(s) and ER records.     Subjective:     Principal Problem:Acute lower GI bleeding    Chief Complaint:   Chief Complaint   Patient presents with    GI Bleeding     onset 2hours        HPI: 78F h/o CAD s/p LARRY, HTN, and HLD presents with hematochezia.  Large amount maroon colored blood passed in son's driveway and son called 911.  In ER, patient continues to bleed, bright red blood mixed with maroon colored blood clots.  She was type and screened stat.    Hemodynamically stable.  Hb 11 on arrival.  Dr. Cheney (GI) was called in ER and recommended transfusing platelets.    Patient denies any fever, chills, N/V, abd pain, diarrhea, constipation, and all other sxs at this time. Pt had a colonoscopy by general surgery last week and had a few polyps removed. Pt takes ASA and Plavix daily and was not told to discontinue these medications during colonoscopy.  Patient is on Plavix for coronary stent placed in January 2018.  Dr. Rebollar in [place] performed colonoscopy last week.  His cell is [phone].         Past Medical History:   Diagnosis Date    CAD (coronary artery disease)     DVT (deep venous thrombosis)     GI bleed     PE (pulmonary thromboembolism)        No past surgical history on file.    Review of patient's allergies indicates:  No Known Allergies    No current facility-administered medications on file prior to encounter.      Current Outpatient Medications on File Prior to Encounter   Medication Sig    aspirin (ECOTRIN) 81 MG EC tablet Take 81 mg by mouth once daily.    clopidogrel (PLAVIX) 75 mg tablet Take 75 mg by mouth once.    metoprolol tartrate (LOPRESSOR) 100 MG tablet Take 50 mg by mouth 2 (two)  times daily.     rosuvastatin (CRESTOR) 20 MG tablet Take 20 mg by mouth once daily.     Family History     None        Tobacco Use    Smoking status: Not on file   Substance and Sexual Activity    Alcohol use: No     Frequency: Never    Drug use: No    Sexual activity: Not on file     Review of Systems   Constitutional: Negative for chills, diaphoresis, fatigue, fever and unexpected weight change.   HENT: Negative for congestion, facial swelling, sore throat and trouble swallowing.    Eyes: Negative for photophobia, redness and visual disturbance.   Respiratory: Negative for apnea, cough, chest tightness, shortness of breath and wheezing.    Cardiovascular: Negative for chest pain, palpitations and leg swelling.   Gastrointestinal: Positive for anal bleeding. Negative for abdominal distention, abdominal pain, blood in stool, constipation, diarrhea, nausea and vomiting.   Endocrine: Negative for polydipsia, polyphagia and polyuria.   Genitourinary: Negative for difficulty urinating, dysuria, flank pain, frequency, hematuria and urgency.   Musculoskeletal: Negative for arthralgias, back pain, joint swelling, myalgias and neck stiffness.   Skin: Negative for pallor and rash.   Allergic/Immunologic: Negative for immunocompromised state.   Neurological: Negative for dizziness, tremors, syncope, weakness, light-headedness and headaches.   Psychiatric/Behavioral: Negative for agitation, confusion and suicidal ideas.   All other systems reviewed and are negative.    Objective:     Vital Signs (Most Recent):  Temp: 98.9 °F (37.2 °C) (02/13/19 2158)  Pulse: 90 (02/13/19 2158)  Resp: 18 (02/13/19 2158)  BP: 110/63 (02/13/19 2158)  SpO2: 100 % (02/13/19 2158) Vital Signs (24h Range):  Temp:  [97.6 °F (36.4 °C)-98.9 °F (37.2 °C)] 98.9 °F (37.2 °C)  Pulse:  [72-90] 90  Resp:  [14-20] 18  SpO2:  [97 %-100 %] 100 %  BP: (110-156)/(57-82) 110/63     Weight: 63.5 kg (140 lb)  There is no height or weight on file to calculate  BMI.    Physical Exam   Constitutional: She is oriented to person, place, and time. She appears well-developed and well-nourished. No distress.   HENT:   Head: Normocephalic and atraumatic.   Mouth/Throat: Oropharynx is clear and moist.   Eyes: Conjunctivae and EOM are normal. Pupils are equal, round, and reactive to light. No scleral icterus.   Neck: Normal range of motion. Neck supple. No JVD present. No thyromegaly present.   Cardiovascular: Normal rate, regular rhythm and intact distal pulses. Exam reveals no gallop and no friction rub.   No murmur heard.  Pulmonary/Chest: Effort normal and breath sounds normal. No respiratory distress. She has no wheezes. She has no rales. She exhibits no tenderness.   Abdominal: Soft. Bowel sounds are normal. She exhibits no distension and no mass. There is no tenderness. There is no guarding.   Genitourinary: Rectal exam shows guaiac positive stool.   Genitourinary Comments: Large amount of dark red blood from rectum seen on bed   Musculoskeletal: Normal range of motion. She exhibits no tenderness.   Neurological: She is alert and oriented to person, place, and time. No cranial nerve deficit.   Skin: Skin is warm and dry. Capillary refill takes less than 2 seconds. No rash noted. She is not diaphoretic. No erythema. There is pallor.   Psychiatric: She has a normal mood and affect.   Nursing note and vitals reviewed.        CRANIAL NERVES     CN III, IV, VI   Pupils are equal, round, and reactive to light.  Extraocular motions are normal.        Significant Labs:   CBC:   Recent Labs   Lab 02/13/19 1742 02/13/19 2019   WBC 6.73  --    HGB 11.0* 8.3*   HCT 33.3* 25.1*  25.1*     --      CMP:   Recent Labs   Lab 02/13/19 1742      K 4.4      CO2 20*   *   BUN 20   CREATININE 1.0   CALCIUM 9.4   PROT 6.3   ALBUMIN 3.8   BILITOT 0.4   ALKPHOS 60   AST 39   ALT 19   ANIONGAP 14   EGFRNONAA 55*     Magnesium: No results for input(s): MG in the last 48  hours.  Urine Studies: No results for input(s): COLORU, APPEARANCEUA, PHUR, SPECGRAV, PROTEINUA, GLUCUA, KETONESU, BILIRUBINUA, OCCULTUA, NITRITE, UROBILINOGEN, LEUKOCYTESUR, RBCUA, WBCUA, BACTERIA, SQUAMEPITHEL, HYALINECASTS in the last 48 hours.    Invalid input(s): WRIGHTSUR  All pertinent labs within the past 24 hours have been reviewed.    Significant Imaging: I have reviewed all pertinent imaging results/findings within the past 24 hours.   Imaging Results          X-Ray Chest AP Portable (Final result)  Result time 02/13/19 19:48:00    Final result by Milton Kat Jr., MD (02/13/19 19:48:00)                 Impression:      No acute findings.      Electronically signed by: Milton Kat MD  Date:    02/13/2019  Time:    19:48             Narrative:    EXAMINATION:  XR CHEST AP PORTABLE    CLINICAL HISTORY:  gi bleed;    COMPARISON:  No comparison studies are available.    FINDINGS:  Heart size is normal.  Lungs appear clear of active disease.  No infiltrates or effusions.  No suspicious mass. Bilateral breast implants                                  Assessment/Plan:     * Acute lower GI bleeding    - Followed up with GI about repeat Hb 8.3  - currently transfusing 3rd unit of platelet  - told RN to stop platelet, transfuse 1 unit PRBC now and repeat H/H afterwards, per GI recs  - continue H/H q4h  - Prep with golytely         CAD (coronary artery disease)    S/p stent placed in Jan 2018  - hold plavix/asa for active GIB         VTE Risk Mitigation (From admission, onward)        Ordered     IP VTE HIGH RISK PATIENT  Once      02/13/19 2012     Place sequential compression device  Until discontinued      02/13/19 2012     Place LENY hose  Until discontinued      02/13/19 2012     Reason for No Pharmacological VTE Prophylaxis  Once      02/13/19 2012             Obed Garrett MD  Department of Hospital Medicine   [facility]

## 2019-02-14 NOTE — HOSPITAL COURSE
Pt taken for Colonoscopy which revealed post polypectomy bleed in cecum but insufficient bowel prep did not permit exam of all reported polypectomy sites. Pt given 4 UPRBCS, 3Plts, 1 cryo.   Per GI,     Suspect post polypectomy bleed that started in setting of antiplatelet medications. Patient is hemodynamically stable but has continued hematochezia, so uncertain if hb drop is reflective of previous bleeding vs some ongoing blood loss. Typically these bleeds are self limited. There may be increased risk of ongoing bleeding given her use of Plavix and ASA. Case has been discussed with patient who agrees to proceeding with platelet therapy despite stents given concern for active bleeding.     With stent, last [facility] 1/2018 2/14: pt now given 4L melina in case colonoscopy needed; need colon prepped in order to perform adequate colonoscopy for bleeding evaluation and management. If pt develops hemodynamic instability or concern for significant on going bleeding despite platelets, recommend additional blood transfusion, STAT CTA, and notify GI in case colonoscopy has to be tried sooner  2/15: Pt now passing small amount of bright red blood. Pt was given 1 U PRBC overnight. GI will continue to monitor. No plans for rescoping yet.  2/16:CBC stable; hemodynamically stable. No overt GI bleeding. Hb stable without any needs for transfusion. Pt denies anymore active bleeding. Tolerates diet. Per GI, ok to resume aspirin, May restart Plavix in 5 days if needed. Seen and examined. Deemed stable to be d/c.

## 2019-02-14 NOTE — TRANSFER OF CARE
"Anesthesia Transfer of Care Note    Patient: Tanesha Beard    Procedure(s) Performed: Procedure(s) (LRB):  COLONOSCOPY (N/A)    Patient location: ICU    Anesthesia Type: MAC    Transport from OR: Transported from OR on room air with adequate spontaneous ventilation    Post pain: adequate analgesia    Post assessment: no apparent anesthetic complications    Post vital signs: stable    Level of consciousness: sedated    Nausea/Vomiting: no nausea/vomiting    Complications: none    Transfer of care protocol was followed      Last vitals:   Visit Vitals  BP (!) 124/40 (BP Location: Left arm, Patient Position: Lying)   Pulse 100   Temp 36.9 °C (98.4 °F) (Temporal)   Resp 20   Ht 5' 5" (1.651 m)   Wt 62.2 kg (137 lb 2 oz)   SpO2 100%   Breastfeeding? No   BMI 22.82 kg/m²     "

## 2019-02-14 NOTE — ANESTHESIA RELEASE NOTE
"Anesthesia Release from PACU Note    Patient: Tanesha Beard    Procedure(s) Performed: Procedure(s) (LRB):  COLONOSCOPY (N/A)    Anesthesia type: MAC    Post pain: Adequate analgesia    Post assessment: no apparent anesthetic complications    Last Vitals:   Visit Vitals  BP (!) 124/40 (BP Location: Left arm, Patient Position: Lying)   Pulse 100   Temp 36.9 °C (98.4 °F) (Temporal)   Resp 20   Ht 5' 5" (1.651 m)   Wt 62.2 kg (137 lb 2 oz)   SpO2 100%   Breastfeeding? No   BMI 22.82 kg/m²       Post vital signs: stable    Level of consciousness: awake    Nausea/Vomiting: no nausea/no vomiting    Complications: none    Airway Patency: patent    Respiratory: unassisted    Cardiovascular: stable and blood pressure at baseline    Hydration: euvolemic  "

## 2019-02-14 NOTE — PLAN OF CARE
Problem: Adult Inpatient Plan of Care  Goal: Plan of Care Review  Outcome: Ongoing (interventions implemented as appropriate)  Vitals signs closely monitored. Fall precautions in place. Patient repositioned self. Pain assessed every two hours. Safety promoted. Infection risks reduced.     Pt finiished drinking melina around noon. BM have cleared up. No longer containing clots. Blood tinged in yellow liquid. Period of low bp. Resolved with bolus. Uneventful shift

## 2019-02-14 NOTE — SUBJECTIVE & OBJECTIVE
Past Medical History:   Diagnosis Date    CAD (coronary artery disease)     DVT (deep venous thrombosis)     GI bleed     PE (pulmonary thromboembolism)        No past surgical history on file.    Review of patient's allergies indicates:  No Known Allergies    No current facility-administered medications on file prior to encounter.      Current Outpatient Medications on File Prior to Encounter   Medication Sig    aspirin (ECOTRIN) 81 MG EC tablet Take 81 mg by mouth once daily.    clopidogrel (PLAVIX) 75 mg tablet Take 75 mg by mouth once.    metoprolol tartrate (LOPRESSOR) 100 MG tablet Take 50 mg by mouth 2 (two) times daily.     rosuvastatin (CRESTOR) 20 MG tablet Take 20 mg by mouth once daily.     Family History     None        Tobacco Use    Smoking status: Not on file   Substance and Sexual Activity    Alcohol use: No     Frequency: Never    Drug use: No    Sexual activity: Not on file     Review of Systems   Constitutional: Negative for chills, diaphoresis, fatigue, fever and unexpected weight change.   HENT: Negative for congestion, facial swelling, sore throat and trouble swallowing.    Eyes: Negative for photophobia, redness and visual disturbance.   Respiratory: Negative for apnea, cough, chest tightness, shortness of breath and wheezing.    Cardiovascular: Negative for chest pain, palpitations and leg swelling.   Gastrointestinal: Positive for anal bleeding. Negative for abdominal distention, abdominal pain, blood in stool, constipation, diarrhea, nausea and vomiting.   Endocrine: Negative for polydipsia, polyphagia and polyuria.   Genitourinary: Negative for difficulty urinating, dysuria, flank pain, frequency, hematuria and urgency.   Musculoskeletal: Negative for arthralgias, back pain, joint swelling, myalgias and neck stiffness.   Skin: Negative for pallor and rash.   Allergic/Immunologic: Negative for immunocompromised state.   Neurological: Negative for dizziness, tremors, syncope,  weakness, light-headedness and headaches.   Psychiatric/Behavioral: Negative for agitation, confusion and suicidal ideas.   All other systems reviewed and are negative.    Objective:     Vital Signs (Most Recent):  Temp: 98.9 °F (37.2 °C) (02/13/19 2158)  Pulse: 90 (02/13/19 2158)  Resp: 18 (02/13/19 2158)  BP: 110/63 (02/13/19 2158)  SpO2: 100 % (02/13/19 2158) Vital Signs (24h Range):  Temp:  [97.6 °F (36.4 °C)-98.9 °F (37.2 °C)] 98.9 °F (37.2 °C)  Pulse:  [72-90] 90  Resp:  [14-20] 18  SpO2:  [97 %-100 %] 100 %  BP: (110-156)/(57-82) 110/63     Weight: 63.5 kg (140 lb)  There is no height or weight on file to calculate BMI.    Physical Exam   Constitutional: She is oriented to person, place, and time. She appears well-developed and well-nourished. No distress.   HENT:   Head: Normocephalic and atraumatic.   Mouth/Throat: Oropharynx is clear and moist.   Eyes: Conjunctivae and EOM are normal. Pupils are equal, round, and reactive to light. No scleral icterus.   Neck: Normal range of motion. Neck supple. No JVD present. No thyromegaly present.   Cardiovascular: Normal rate, regular rhythm and intact distal pulses. Exam reveals no gallop and no friction rub.   No murmur heard.  Pulmonary/Chest: Effort normal and breath sounds normal. No respiratory distress. She has no wheezes. She has no rales. She exhibits no tenderness.   Abdominal: Soft. Bowel sounds are normal. She exhibits no distension and no mass. There is no tenderness. There is no guarding.   Genitourinary: Rectal exam shows guaiac positive stool.   Genitourinary Comments: Large amount of dark red blood from rectum seen on bed   Musculoskeletal: Normal range of motion. She exhibits no tenderness.   Neurological: She is alert and oriented to person, place, and time. No cranial nerve deficit.   Skin: Skin is warm and dry. Capillary refill takes less than 2 seconds. No rash noted. She is not diaphoretic. No erythema. There is pallor.   Psychiatric: She has a  normal mood and affect.   Nursing note and vitals reviewed.        CRANIAL NERVES     CN III, IV, VI   Pupils are equal, round, and reactive to light.  Extraocular motions are normal.        Significant Labs:   CBC:   Recent Labs   Lab 02/13/19 1742 02/13/19 2019   WBC 6.73  --    HGB 11.0* 8.3*   HCT 33.3* 25.1*  25.1*     --      CMP:   Recent Labs   Lab 02/13/19 1742      K 4.4      CO2 20*   *   BUN 20   CREATININE 1.0   CALCIUM 9.4   PROT 6.3   ALBUMIN 3.8   BILITOT 0.4   ALKPHOS 60   AST 39   ALT 19   ANIONGAP 14   EGFRNONAA 55*     Magnesium: No results for input(s): MG in the last 48 hours.  Urine Studies: No results for input(s): COLORU, APPEARANCEUA, PHUR, SPECGRAV, PROTEINUA, GLUCUA, KETONESU, BILIRUBINUA, OCCULTUA, NITRITE, UROBILINOGEN, LEUKOCYTESUR, RBCUA, WBCUA, BACTERIA, SQUAMEPITHEL, HYALINECASTS in the last 48 hours.    Invalid input(s): WRIGHTSUR  All pertinent labs within the past 24 hours have been reviewed.    Significant Imaging: I have reviewed all pertinent imaging results/findings within the past 24 hours.   Imaging Results          X-Ray Chest AP Portable (Final result)  Result time 02/13/19 19:48:00    Final result by Milton Kat Jr., MD (02/13/19 19:48:00)                 Impression:      No acute findings.      Electronically signed by: Milton Kat MD  Date:    02/13/2019  Time:    19:48             Narrative:    EXAMINATION:  XR CHEST AP PORTABLE    CLINICAL HISTORY:  gi bleed;    COMPARISON:  No comparison studies are available.    FINDINGS:  Heart size is normal.  Lungs appear clear of active disease.  No infiltrates or effusions.  No suspicious mass. Bilateral breast implants

## 2019-02-14 NOTE — [employer] PATIENT INSTRUCTIONS
Discharge Summary/Instructions after an Endoscopic Procedure  Patient Name: Tanesha Beard  Patient MRN: [id number]  Patient YOB: 1940  Thursday, February 14, 2019 Sheree Cheney MD  RESTRICTIONS:  During your procedure today, you received medications for sedation.  These   medications may affect your judgment, balance and coordination.  Therefore,   for 24 hours, you have the following restrictions:   - DO NOT drive a car, operate machinery, make legal/financial decisions,   sign important papers or drink alcohol.    ACTIVITY:  Today: no heavy lifting, straining or running due to procedural   sedation/anesthesia.  The following day: return to full activity including work.  DIET:  Eat and drink normally unless instructed otherwise.     TREATMENT FOR COMMON SIDE EFFECTS:  - Mild abdominal pain, nausea, belching, bloating or excessive gas:  rest,   eat lightly and use a heating pad.  - Sore Throat: treat with throat lozenges and/or gargle with warm salt   water.  - Because air was used during the procedure, expelling large amounts of air   from your rectum or belching is normal.  - If a bowel prep was taken, you may not have a bowel movement for 1-3 days.    This is normal.  SYMPTOMS TO WATCH FOR AND REPORT TO YOUR PHYSICIAN:  1. Abdominal pain or bloating, other than gas cramps.  2. Chest pain.  3. Back pain.  4. Signs of infection such as: chills or fever occurring within 24 hours   after the procedure.  5. Rectal bleeding, which would show as bright red, maroon, or black stools.   (A tablespoon of blood from the rectum is not serious, especially if   hemorrhoids are present.)  6. Vomiting.  7. Weakness or dizziness.  GO DIRECTLY TO THE NEAREST EMERGENCY ROOM IF YOU HAVE ANY OF THE FOLLOWING:      Difficulty breathing              Chills and/or fever over 101 F   Persistent vomiting and/or vomiting blood   Severe abdominal pain   Severe chest pain   Black, tarry stools   Bleeding- more than one  tablespoon   Any other symptom or condition that you feel may need urgent attention  Your doctor recommends these additional instructions:  If any biopsies were taken, your doctors clinic will contact you in 1 to 2   weeks with any results.  - Return patient to ICU for ongoing care.   - Transfuse a 4th unit of blood and then repeat CBC.  - Once stable and alert after procedure patient needs to complete   colonoscopy preparation in case repeat exam needed  - Continue to hold ASA and Plavix.  - Patient will pass additional blood with clots given the residual seen   during colonoscopy, so will have to use hemodynamics and Hb to assist with   determination of active bleeding.  - If concern for active bleeding would send for a STAT CTA and notify GI.  - Make the patient NPO starting today.   - Continue present medications.   - No repeat colonoscopy.  For questions, problems or results please call your physician Sheree Cheney MD at Work:  [phone]  If you have any questions about the above instructions, call the GI   department at [phone] or call the endoscopy unit at [phone]   from 7am until 3 pm.  [facility] [place], EMERGENCY ROOM PHONE NUMBER:   [phone]  IF A COMPLICATION OR EMERGENCY SITUATION ARISES AND YOU ARE UNABLE TO REACH   YOUR PHYSICIAN - GO DIRECTLY TO THE EMERGENCY ROOM.  I have read or have had read to me these discharge instructions for my   procedure and have received a written copy.  I understand these   instructions and will follow-up with my physician if I have any questions.     __________________________________       _____________________________________  Nurse Signature                                          Patient/Designated   Responsible Party Signature  Sheree Cheney MD  2/14/2019 5:47:23 AM  This report has been verified and signed electronically.  [employer]

## 2019-02-14 NOTE — EICU
Patient seen on video, chart reviewed, spoke with RN   78F h/o CAD s/p LARRY, HTN, and HLD presents with hematochezia.  Large amount maroon colored blood passed in son's driveway and son called 911.  In ER, patient continues to bleed, bright red blood mixed with maroon colored blood clots.  She was type and screened stat.    Hemodynamically stable.  Hb 11 on arrival.  Dr. Cheney (GI) was called in ER and recommended transfusing platelets.    Patient denies any fever, chills, N/V, abd pain, diarrhea, constipation, and all other sxs at this time. Pt had a colonoscopy by general surgery last week and had a few polyps removed. Pt takes ASA and Plavix daily and was not told to discontinue these medications during colonoscopy.  Patient is on Plavix for coronary stent placed in January 2018.   Currently, patient still passing bright red stool and clots. BP dropping.   - recommend stat 2 PRBC transfusion   - send stat Hb now   - will start on Norepinephrine +/- Vasopressin   - Surgery consult   - GI on board   - might need a central line

## 2019-02-14 NOTE — PROGRESS NOTES
[facility] Hospital Medicine  Progress Note    Patient Name: Tanesha Beard  MRN: [id number]  Patient Class: IP- Inpatient   Admission Date: 2/13/2019  Length of Stay: 1 days  Attending Physician: Mary Crowe MD  Primary Care Provider: Primary Doctor No        Subjective:     Principal Problem:Acute blood loss anemia    HPI:  78F h/o CAD s/p LARRY, HTN, and HLD presents with hematochezia.  Large amount maroon colored blood passed in son's driveway and son called 911.  In ER, patient continues to bleed, bright red blood mixed with maroon colored blood clots.  She was type and screened stat.    Hemodynamically stable.  Hb 11 on arrival.  Dr. Cheney (GI) was called in ER and recommended transfusing platelets.    Patient denies any fever, chills, N/V, abd pain, diarrhea, constipation, and all other sxs at this time. Pt had a colonoscopy by general surgery last week and had a few polyps removed. Pt takes ASA and Plavix daily and was not told to discontinue these medications during colonoscopy.  Patient is on Plavix for coronary stent placed in January 2018.  Dr. Rebollar in [place] performed colonoscopy last week.  His cell is [phone].         Hospital Course:  Pt taken for Colonoscipy which revealed post polypectomy bleed in cecum but insufficient bowel prep did not permit exam of all reported polypectomy sites. Pt given 4 UPRBCS, 3Plts, 1 cryo.   Per GI,     Suspect post polypectomy bleed that started in setting of antiplatelet medications. Patient is hemodynamically stable but has continued hematochezia, so uncertain if hb drop is reflective of previous bleeding vs some ongoing blood loss. Typically these bleeds are self limited. There may be increased risk of ongoing bleeding given her use of Plavix and ASA. Case has been discussed with patient who agrees to proceeding with platelet therapy despite stents given concern for active bleeding.     With stent, last [facility] 1/2018 2/14: pt  now given 4L melina in case colonoscopy needed; need colon prepped in order to perform adequate colonoscopy for bleeding evaluation and management. If pt develops hemodynamic instability or concern for significant on going bleeding despite platelets, recommend additional blood transfusion, STAT CTA, and notify GI in case colonoscopy has to be tried sooner        Interval History:pt still has massive dark blood passing through and is still working on the prep  Review of Systems Constitutional: Negative for chills, diaphoresis, fatigue, fever and unexpected weight change.   HENT: Negative for congestion, facial swelling, sore throat and trouble swallowing.    Eyes: Negative for photophobia, redness and visual disturbance.   Respiratory: Negative for apnea, cough, chest tightness, shortness of breath and wheezing.    Cardiovascular: Negative for chest pain, palpitations and leg swelling.   Gastrointestinal: Positive for anal bleeding. Negative for abdominal distention, abdominal pain, blood in stool, constipation, diarrhea, nausea and vomiting.   Endocrine: Negative for polydipsia, polyphagia and polyuria.   Genitourinary: Negative for difficulty urinating, dysuria, flank pain, frequency, hematuria and urgency.   Musculoskeletal: Negative for arthralgias, back pain, joint swelling, myalgias and neck stiffness.   Skin: Negative for pallor and rash.   Allergic/Immunologic: Negative for immunocompromised state.   Neurological: Negative for dizziness, tremors, syncope, weakness, light-headedness and headaches.   Psychiatric/Behavioral: Negative for agitation, confusion and suicidal ideas.   All other systems reviewed and are negative.      Objective:     Vital Signs (Most Recent):  Temp: 99.5 °F (37.5 °C) (02/14/19 1515)  Pulse: 109 (02/14/19 1630)  Resp: 19 (02/14/19 1630)  BP: (!) 101/42 (02/14/19 1630)  SpO2: 96 % (02/14/19 1630) Vital Signs (24h Range):  Temp:  [97.6 °F (36.4 °C)-99.6 °F (37.6 °C)] 99.5 °F (37.5  °C)  Pulse:  [] 109  Resp:  [11-24] 19  SpO2:  [94 %-100 %] 96 %  BP: ()/(17-89) 101/42     Weight: 62.2 kg (137 lb 2 oz)  Body mass index is 22.82 kg/m².    Intake/Output Summary (Last 24 hours) at 2/14/2019 1655  Last data filed at 2/14/2019 1600  Gross per 24 hour   Intake 9616.5 ml   Output --   Net 9616.5 ml      Physical Exam Constitutional: She is oriented to person, place, and time. She appears well-developed and well-nourished. No distress.   HENT:   Head: Normocephalic and atraumatic.   Mouth/Throat: Oropharynx is clear and moist.   Eyes: Conjunctivae and EOM are normal. Pupils are equal, round, and reactive to light. No scleral icterus.   Neck: Normal range of motion. Neck supple. No JVD present. No thyromegaly present.   Cardiovascular: Normal rate, regular rhythm and intact distal pulses. Exam reveals no gallop and no friction rub.   No murmur heard.  Pulmonary/Chest: Effort normal and breath sounds normal. No respiratory distress. She has no wheezes. She has no rales. She exhibits no tenderness.   Abdominal: Soft. Bowel sounds are normal. She exhibits no distension and no mass. There is no tenderness. There is no guarding. Mild lower abdominal tenderness  Genitourinary Comments: Large amount of dark red blood from rectum seen on pads   Musculoskeletal: Normal range of motion. She exhibits no tenderness.   Neurological: She is alert and oriented to person, place, and time. No cranial nerve deficit.   Skin: Skin is warm and dry. Capillary refill takes less than 2 seconds. No rash noted. She is not diaphoretic. No erythema. There is pallor.   Psychiatric: She has a normal mood and affect.   Nursing note and vitals reviewed.       Significant Labs: All pertinent labs within the past 24 hours have been reviewed..      Significant Imaging: I have reviewed all pertinent imaging results/findings within the past 24 hours.    Assessment/Plan:      * Acute blood loss anemia    S/p 4 UPRBCS, 3Plts, 1  cryo  Monitor H/h q4hr       Acute hyperglycemia           CAD (coronary artery disease)    S/p stent placed in Jan 2018  - hold plavix/asa for active GIB       Acute lower GI bleeding    - Gi on board, possibel cscope after bowel prep  - continue H/H q4h  - Prep with golytely           VTE Risk Mitigation (From admission, onward)        Ordered     IP VTE HIGH RISK PATIENT  Once      02/13/19 2012     Place sequential compression device  Until discontinued      02/13/19 2012     Place LENY hose  Until discontinued      02/13/19 2012     Reason for No Pharmacological VTE Prophylaxis  Once      02/13/19 2012          Critical care time spent on the evaluation and treatment of severe organ dysfunction, review of pertinent labs and imaging studies, discussions with consulting providers and discussions with patient/family: >30 minutes.    Mary Crowe MD  Department of Hospital Medicine   [facility]

## 2019-02-14 NOTE — ASSESSMENT & PLAN NOTE
- Followed up with GI about repeat Hb 8.3  - currently transfusing 3rd unit of platelet  - told RN to stop platelet, transfuse 1 unit PRBC now and repeat H/H afterwards, per GI recs  - continue H/H q4h  - Prep with golytely

## 2019-02-14 NOTE — PROGRESS NOTES
Surgery was sent to secured check message to be aware of a patient with a GI bleed.    There was no formal request for consultation.    Patient underwent a colonoscopy by Gastroenterology and was found to have a post polypectomy bleed located in the cecum.    Further management of the GI bleed per Gastroenterology.    Please formally consult general surgery if additional surgical input is required

## 2019-02-14 NOTE — NURSING
Care resumed. Dr. Cheney informed pt, pt's daughter, and myself of the results. Will transfuse another unit of PRBC. VSS. Pt drowsy but easily aroused. Family at bedside.

## 2019-02-14 NOTE — HPI
78F h/o CAD s/p LARRY, HTN, and HLD presents with hematochezia.  Large amount maroon colored blood passed in son's driveway and son called 911.  In ER, patient continues to bleed, bright red blood mixed with maroon colored blood clots.  She was type and screened stat.    Hemodynamically stable.  Hb 11 on arrival.  Dr. Cheney (GI) was called in ER and recommended transfusing platelets.    Patient denies any fever, chills, N/V, abd pain, diarrhea, constipation, and all other sxs at this time. Pt had a colonoscopy by general surgery last week and had a few polyps removed. Pt takes ASA and Plavix daily and was not told to discontinue these medications during colonoscopy.  Patient is on Plavix for coronary stent placed in January 2018.  Dr. Rebollar in [place] performed colonoscopy last week.  His cell is [phone].

## 2019-02-14 NOTE — CONSULTS
[facility] -   Critical Care Medicine  Consult Note    Patient Name: Tanesha Beard  MRN: [id number]  Admission Date: 2/13/2019  Hospital Length of Stay: 1 days  Code Status: Full Code  Attending Physician: Mary Crowe MD   Primary Care Provider: Primary Doctor No   Principal Problem: Acute blood loss anemia      Subjective:     HPI:  78 year old female visiting son (lives and receives routine care in [place], [place]) with PMH of CAD with stent, HTN, HLD, DVT/PE  Presented to ED on 2/13 with complaint of hematochezia with clots    ED evaluation initial h/h 11/33; glucose 154  GI consulted, plan transfuse platelets, admit ICU, monitor h/h q4    H/H fell with concurrent hypotension and reported ongoing hematochezia with clots requiring total transfusions overnight of 3 plt, 1 cryo, and 4 PRBC  Colonoscopy early am revealed post polypectomy bleed in cecum but insufficient bowel prep did not permit exam of all reported polypectomy sites    Hospital/ICU Course:  H/h holding post transfusion, continued BMs with ongoing Golytely prep are reported dark blood; BP stabilized overnight now decreasing with HR low 100s    Past Medical History:   Diagnosis Date    Anticoagulant long-term use     Arthritis     CAD (coronary artery disease)     DVT (deep venous thrombosis)     Encounter for blood transfusion     GI bleed     Hypertension     PE (pulmonary thromboembolism)     Stroke     TIA       Past Surgical History:   Procedure Laterality Date    APPENDECTOMY      CARDIAC CATHETERIZATION      2 stents    CHOLECYSTECTOMY      FOOT SURGERY Bilateral     JOINT REPLACEMENT      right    SINUS SURGERY         Review of patient's allergies indicates:   Allergen Reactions    Sulfa (sulfonamide antibiotics)        Family History     Problem Relation (Age of Onset)    Colon cancer Sister    Pancreatic cancer Mother    Stroke Father        Tobacco Use    Smoking status: Former Smoker      "Packs/day: 1.00     Years: 10.00     Pack years: 10.00     Types: Cigarettes     Last attempt to quit: 1973     Years since quittin.0    Smokeless tobacco: Never Used   Substance and Sexual Activity    Alcohol use: No     Frequency: Never    Drug use: No    Sexual activity: Not on file         Review of Systems   Constitutional: Positive for fatigue.   Respiratory: Positive for shortness of breath (with exertion only).    Gastrointestinal: Positive for blood in stool and diarrhea. Negative for abdominal distention and vomiting.        Abdominal "soreness"   Musculoskeletal: Negative.    Skin: Negative for pallor.   Hematological: Bruises/bleeds easily.   Psychiatric/Behavioral: Negative.      Objective:     Vital Signs (Most Recent):  Temp: 98.5 °F (36.9 °C) (19 0830)  Pulse: 103 (19 1030)  Resp: 19 (19 1030)  BP: (!) 96/41 (19 1030)  SpO2: 100 % (19 1030) Vital Signs (24h Range):  Temp:  [97.6 °F (36.4 °C)-98.9 °F (37.2 °C)] 98.5 °F (36.9 °C)  Pulse:  [] 103  Resp:  [11-24] 19  SpO2:  [96 %-100 %] 100 %  BP: ()/(17-89) 96/41     Weight: 62.2 kg (137 lb 2 oz)  Body mass index is 22.82 kg/m².      Intake/Output Summary (Last 24 hours) at 2019 1105  Last data filed at 2019 1000  Gross per 24 hour   Intake 6666.5 ml   Output --   Net 6666.5 ml       Physical Exam   Constitutional: She is oriented to person, place, and time. She appears well-developed and well-nourished. She is cooperative. She does not have a sickly appearance. No distress. Nasal cannula in place.   HENT:   Head: Atraumatic.   Eyes: Conjunctivae are normal. Pupils are equal, round, and reactive to light.   Neck: No JVD present. Carotid bruit is not present.   Cardiovascular: Regular rhythm. Tachycardia present.   Murmur heard.   Systolic murmur is present.  Pulses:       Radial pulses are 2+ on the right side, and 2+ on the left side.   Pulmonary/Chest: Effort normal. She has no wheezes. " She has no rhonchi. She has no rales.   Abdominal: Soft. She exhibits no distension. There is no tenderness.   Musculoskeletal: She exhibits edema (trace, non pitting).   Neurological: She is alert and oriented to person, place, and time. GCS eye subscore is 4. GCS verbal subscore is 5. GCS motor subscore is 6.   Skin: Skin is warm and dry. Capillary refill takes 2 to 3 seconds. No cyanosis.   Psychiatric: She has a normal mood and affect. Her speech is normal and behavior is normal. Thought content normal. Cognition and memory are normal.       Vents:  Oxygen Concentration (%): 28 (02/14/19 0836)    Lines/Drains/Airways     Peripheral Intravenous Line                 Peripheral IV - Single Lumen 02/13/19 1746 Right Antecubital less than 1 day         Peripheral IV - Single Lumen 02/14/19 0700 Left Forearm less than 1 day                Significant Labs:    CBC/Anemia Profile:  Recent Labs   Lab 02/13/19  1742 02/13/19  2019 02/14/19  0040 02/14/19  0829   WBC 6.73  --   --   --    HGB 11.0* 8.3* 7.5* 9.7*   HCT 33.3* 25.1*  25.1* 22.5* 29.0*     --   --   --    MCV 94  --   --   --    RDW 12.5  --   --   --         Chemistries:  Recent Labs   Lab 02/13/19  1742      K 4.4      CO2 20*   BUN 20   CREATININE 1.0   CALCIUM 9.4   ALBUMIN 3.8   PROT 6.3   BILITOT 0.4   ALKPHOS 60   ALT 19   AST 39       Coagulation:   Recent Labs   Lab 02/13/19  1742   INR 1.0   APTT 21.9     All pertinent labs within the past 24 hours have been reviewed.    Significant Imaging:   I have reviewed all pertinent imaging results/findings within the past 24 hours.      ABG  No results for input(s): PH, PO2, PCO2, HCO3, BE in the last 168 hours.  Assessment/Plan:     Cardiac/Vascular   CAD (coronary artery disease)    With stent, last [facility] 1/2018  Holding antiplatelets due to GI bleed     Oncology   * Acute blood loss anemia    Continue h/h monitoring q4  Conservative transfusion protocol for target hgb 8     Endocrine    Acute hyperglycemia    Likely stress related  Send hgbA1c for completeness  Repeat chemistry with next lab draw     GI   Acute lower GI bleeding    Post polypectomy bleed noted in cecum on incomplete colonoscopy  Plan additional scope once bowel prep complete  GI following  Hold antiplatelet meds         Critical Care Daily Checklist:    A: Awake: RASS Goal/Actual Goal: RASS Goal: -1-->drowsy  Actual: Joel Agitation Sedation Scale (RASS): Alert and calm   B: Spontaneous Breathing Trial Performed?     C: SAT & SBT Coordinated?  n/a                      D: Delirium: CAM-ICU Overall CAM-ICU: Negative   E: Early Mobility Performed? yes   F: Feeding Goal:    Status:     Current Diet Order   Procedures    Diet NPO      AS: Analgesia/Sedation n/a   T: Thromboembolic Prophylaxis SCD   H: HOB > 300 Yes   U: Stress Ulcer Prophylaxis (if needed) PPI   G: Glucose Control Check A1c, repeat chemistry   B: Bowel Function Stool Occurrence: 1   I: Indwelling Catheter (Lines & Farrar) Necessity reviewed   D: De-escalation of Antimicrobials/Pharmacotherapies reviewed    Plan for the day/ETD As above    Code Status:  Family/Goals of Care: Full Code  Home on discharge   I have discussed case and plan of care in detail with Dr Mac; Status and plan of care were discussed with team on multidisciplinary rounds.    Critical Care Time: 45 minutes  Critical secondary to acute blood loss anemia s/t GI bleed post polypectomy; Critical care was time spent personally by me on the following activities: development of treatment plan with patient or surrogate and bedside caregivers, discussions with consultants, evaluation of patient's response to treatment, examination of patient, ordering and performing treatments and interventions, ordering and review of laboratory studies, ordering and review of radiographic studies, pulse oximetry, re-evaluation of patient's condition. This critical care time did not overlap with that of any other  provider or involve time for any procedures.    Thank you for your consult.     Makenzie Villarreal NP  Critical Care Medicine  [facility] [place]

## 2019-02-14 NOTE — NURSING
Spoke with Jim OAKLEY in room. Pt hypotensive, irregular rhythm, nauseous, and weak. Pt continuously bleeding from rectum. MD recommended central line and to call GI. Dr. Cheney and Dr. Garrett contacted. 2nd PRBC transfusing now. Ordered to increase rate to 300 cc/hr.  Will hold off on central line and give another unit PRBC and 1L NS bolus. Dr. Cheney in route. Pt's family contacted and updated on situation.

## 2019-02-14 NOTE — SUBJECTIVE & OBJECTIVE
Interval History:pt still has massive dark blood passing through and is still working on the prep  Review of Systems Constitutional: Negative for chills, diaphoresis, fatigue, fever and unexpected weight change.   HENT: Negative for congestion, facial swelling, sore throat and trouble swallowing.    Eyes: Negative for photophobia, redness and visual disturbance.   Respiratory: Negative for apnea, cough, chest tightness, shortness of breath and wheezing.    Cardiovascular: Negative for chest pain, palpitations and leg swelling.   Gastrointestinal: Positive for anal bleeding. Negative for abdominal distention, abdominal pain, blood in stool, constipation, diarrhea, nausea and vomiting.   Endocrine: Negative for polydipsia, polyphagia and polyuria.   Genitourinary: Negative for difficulty urinating, dysuria, flank pain, frequency, hematuria and urgency.   Musculoskeletal: Negative for arthralgias, back pain, joint swelling, myalgias and neck stiffness.   Skin: Negative for pallor and rash.   Allergic/Immunologic: Negative for immunocompromised state.   Neurological: Negative for dizziness, tremors, syncope, weakness, light-headedness and headaches.   Psychiatric/Behavioral: Negative for agitation, confusion and suicidal ideas.   All other systems reviewed and are negative.      Objective:     Vital Signs (Most Recent):  Temp: 99.5 °F (37.5 °C) (02/14/19 1515)  Pulse: 109 (02/14/19 1630)  Resp: 19 (02/14/19 1630)  BP: (!) 101/42 (02/14/19 1630)  SpO2: 96 % (02/14/19 1630) Vital Signs (24h Range):  Temp:  [97.6 °F (36.4 °C)-99.6 °F (37.6 °C)] 99.5 °F (37.5 °C)  Pulse:  [] 109  Resp:  [11-24] 19  SpO2:  [94 %-100 %] 96 %  BP: ()/(17-89) 101/42     Weight: 62.2 kg (137 lb 2 oz)  Body mass index is 22.82 kg/m².    Intake/Output Summary (Last 24 hours) at 2/14/2019 1655  Last data filed at 2/14/2019 1600  Gross per 24 hour   Intake 9616.5 ml   Output --   Net 9616.5 ml      Physical Exam Constitutional: She is  oriented to person, place, and time. She appears well-developed and well-nourished. No distress.   HENT:   Head: Normocephalic and atraumatic.   Mouth/Throat: Oropharynx is clear and moist.   Eyes: Conjunctivae and EOM are normal. Pupils are equal, round, and reactive to light. No scleral icterus.   Neck: Normal range of motion. Neck supple. No JVD present. No thyromegaly present.   Cardiovascular: Normal rate, regular rhythm and intact distal pulses. Exam reveals no gallop and no friction rub.   No murmur heard.  Pulmonary/Chest: Effort normal and breath sounds normal. No respiratory distress. She has no wheezes. She has no rales. She exhibits no tenderness.   Abdominal: Soft. Bowel sounds are normal. She exhibits no distension and no mass. There is no tenderness. There is no guarding. Mild lower abdominal tenderness  Genitourinary Comments: Large amount of dark red blood from rectum seen on pads   Musculoskeletal: Normal range of motion. She exhibits no tenderness.   Neurological: She is alert and oriented to person, place, and time. No cranial nerve deficit.   Skin: Skin is warm and dry. Capillary refill takes less than 2 seconds. No rash noted. She is not diaphoretic. No erythema. There is pallor.   Psychiatric: She has a normal mood and affect.   Nursing note and vitals reviewed.       Significant Labs: All pertinent labs within the past 24 hours have been reviewed..      Significant Imaging: I have reviewed all pertinent imaging results/findings within the past 24 hours.

## 2019-02-14 NOTE — ANESTHESIA POSTPROCEDURE EVALUATION
"Anesthesia Post Evaluation    Patient: Tanesha Beard    Procedure(s) Performed: Procedure(s) (LRB):  COLONOSCOPY (N/A)    Final Anesthesia Type: MAC  Patient location during evaluation: ICU  Patient participation: Yes- Able to Participate  Level of consciousness: awake and alert  Post-procedure vital signs: reviewed and stable  Pain management: adequate  PONV status at discharge: No PONV  Anesthetic complications: no      Cardiovascular status: blood pressure returned to baseline  Respiratory status: unassisted  Hydration status: euvolemic  Follow-up not needed.        Visit Vitals  BP (!) 124/40 (BP Location: Left arm, Patient Position: Lying)   Pulse 100   Temp 36.9 °C (98.4 °F) (Temporal)   Resp 20   Ht 5' 5" (1.651 m)   Wt 62.2 kg (137 lb 2 oz)   SpO2 100%   Breastfeeding? No   BMI 22.82 kg/m²       Pain/Mikaela Score: Pain Rating Prior to Med Admin: 10 (2/13/2019  7:20 PM)        "

## 2019-02-14 NOTE — HPI
78 year old female visiting son (lives and receives routine care in [place], [place]) with PMH of CAD with stent, HTN, HLD, DVT/PE  Presented to ED on 2/13 with complaint of hematochezia with clots    ED evaluation initial h/h 11/33; glucose 154  GI consulted, plan transfuse platelets, admit ICU, monitor h/h q4    H/H fell with concurrent hypotension and reported ongoing hematochezia with clots requiring total transfusions overnight of 3 plt, 1 cryo, and 4 PRBC  Colonoscopy early am revealed post polypectomy bleed in cecum but insufficient bowel prep did not permit exam of all reported polypectomy sites

## 2019-02-14 NOTE — ASSESSMENT & PLAN NOTE
Post polypectomy bleed noted in cecum on incomplete colonoscopy  Plan additional scope once bowel prep complete  GI following  Hold antiplatelet meds

## 2019-02-14 NOTE — PLAN OF CARE
Problem: Adult Inpatient Plan of Care  Goal: Plan of Care Review  Outcome: Ongoing (interventions implemented as appropriate)  Pt with continuous hematochezia after received from ER. Moderate to large amounts of danna blood with large clots. Symptomatic hypotension followed. 4 units PRBCs total given. Dr. Cheney performed colonoscopy at bedside. Pt now with decreased bleeding and VSS on 2L NC. Pt drowsy but easily aroused. Afebrile. Unable to assess UO due to continuous rectal bleeding. Pt NPO but ordered to finish GoLytely this morning. No skin issues. Pt turned independently throughout the night. POC reviewed with pt and family. All questions and concerns addressed.

## 2019-02-14 NOTE — PROGRESS NOTES
No evidence of additional bright red bleeding, seems mostly old/dark blood passed but clearing. Patient feeling better and has been hemodynamically stable. Hb stable during the day.    -No concern for ongoing bleeding so no plans for colonoscopy at this time  -If next Hb stable ok to start clear liquid diet  -If labs stable in AM ok to advance diet  -Continue to hold ASA and plavix

## 2019-02-14 NOTE — PROGRESS NOTES
Transfusion review:     Received:   3 units platelets  2 units PRBCs    Total PRBC prepared: 5 units  Total PRBC to be transfused prior to scope: 3 units  Total PRBC on hold for procedure: 2 units    Prepare and hold 1 unit of cryoprecipitate prior to scope.

## 2019-02-14 NOTE — ANESTHESIA PREPROCEDURE EVALUATION
02/14/2019  Tanesha Beard is a 78 y.o., female.    Anesthesia Evaluation    I have reviewed the Patient Summary Reports.    I have reviewed the Nursing Notes.   I have reviewed the Medications.     Review of Systems  Anesthesia Hx:  No problems with previous Anesthesia  Denies Family Hx of Anesthesia complications.   Denies Personal Hx of Anesthesia complications.   Social:  Non-Smoker    Hematology/Oncology:     Oncology Normal    -- Anemia:   EENT/Dental:EENT/Dental Normal   Cardiovascular:   Exercise tolerance: good CAD asymptomatic     Pulmonary:  Pulmonary Normal    Renal/:  Renal/ Normal     Hepatic/GI:  Hepatic/GI Normal    Musculoskeletal:  Musculoskeletal Normal    Neurological:  Neurology Normal    Endocrine:  Endocrine Normal    Dermatological:  Skin Normal    Psych:  Psychiatric Normal           Physical Exam  General:  Well nourished    Airway/Jaw/Neck:  Airway Findings: Mouth Opening: Normal Tongue: Normal  General Airway Assessment: Adult, Average  Mallampati: II  Improves to II with phonation.  TM Distance: Normal, at least 6 cm        Eyes/Ears/Nose:  EYES/EARS/NOSE FINDINGS: Normal   Dental:  Dental Findings: In tact   Chest/Lungs:  Chest/Lungs Findings: Normal Respiratory Rate     Heart/Vascular:  Heart Findings: Rate: Tachycardia  Heart murmur: negative Vascular Findings: Normal    Abdomen:  Abdomen Findings: Normal    Musculoskeletal:  Musculoskeletal Findings: Normal   Skin:  Skin Findings: Normal    Mental Status:  Mental Status Findings:  Cooperative, Alert and Oriented         Anesthesia Plan  Type of Anesthesia, risks & benefits discussed:  Anesthesia Type:  MAC  Patient's Preference:   Intra-op Monitoring Plan:   Intra-op Monitoring Plan Comments:   Post Op Pain Control Plan:   Post Op Pain Control Plan Comments:   Induction:   IV  Beta Blocker:  Patient is on a Beta-Blocker  and has received one dose within the past 24 hours (No further documentation required).       Informed Consent: Patient understands risks and agrees with Anesthesia plan.  Questions answered. Anesthesia consent signed with patient.  ASA Score: 2  emergent   Day of Surgery Review of History & Physical: I have interviewed and examined the patient. I have reviewed the patient's H&P dated:  There are no significant changes.          Ready For Surgery From Anesthesia Perspective.

## 2019-02-14 NOTE — ED NOTES
Please guide all further communications towards   Daughter:  Mili garcia [phone]  Son: Evaristo Beard [phone]

## 2019-02-14 NOTE — HPI
78F on ASA and plavix had colonoscopy last week while remaining on ASA and Plavix with polypectomy x4 now today developed rectal bleeding. She remains hemodynamically stable with normal BUN/cr, but has continued with hematochezia. Hb ~11 now down to ~8.  She has some abdominal soreness but no abdominal pain. Per reports she did not have diverticula. Of note she had cardiac stents placed a year ago in Jan. She denies any CP. She is feeling better than when she first arrived.    Spoke with Dr. Skaggs: Polyps 2 in cecum, 43cm and 40cm in descending with hot biopsy forceps reports there were all small sessile polyps.

## 2019-02-14 NOTE — PLAN OF CARE
Assessment completed.  Met with the patient/ family. CM explained and left info in blue transition of care folder regarding Advance Directives, Living Will ( FULL CODE) ,  Smoking Cessation , Pamphlet on D/C planning on admission and Pharmacy bedside delivery.  The role of CM explained for ICU transitions of care/ discharge planning. Patient per EMR,78F h/o CAD s/p LARRY, HTN, and HLD presents with hematochezia.  Large amount maroon colored blood passed in son's driveway and son called 911.  In ER, patient continues to bleed, bright red blood mixed with maroon colored blood clots.  She was type and screened stat.    Hemodynamically stable.  Hb 11 on arrival.  Dr. Cheney (GI) was called in ER and recommended transfusing platelets.    Patient denies any fever, chills, N/V, abd pain, diarrhea, constipation, and all other sxs at this time. Pt had a colonoscopy by general surgery last week and had a few polyps removed. Pt takes ASA and Plavix daily and was not told to discontinue these medications during colonoscopy.  Patient is on Plavix for coronary stent placed in January 2018.  Dr. Rebollar in [place] performed colonoscopy last week.  His cell is [phone].  Patient Patient is not compliant with meds or clinic appointments.  Patient has BCBS Medicare insurance. Patient has no needs at this time. CM to f/u for safe transition    Primary Doctor No     No Pharmacies Listed       02/14/19 9723   Discharge Assessment   Assessment Type Discharge Planning Assessment   Confirmed/corrected address and phone number on facesheet? Yes   Assessment information obtained from? Patient   Expected Length of Stay (days) (TBD)   Communicated expected length of stay with patient/caregiver no   Prior to hospitilization cognitive status: Alert/Oriented   Prior to hospitalization functional status: Independent   Current cognitive status: Alert/Oriented   Current Functional Status: Independent   Lives With alone   Able to Return to Prior  Arrangements yes   Is patient able to care for self after discharge? Yes   Patient's perception of discharge disposition home or selfcare   Readmission Within the Last 30 Days no previous admission in last 30 days   Patient currently being followed by outpatient case management? Unable to determine (comments)   Patient currently receives any other outside agency services? No   Equipment Currently Used at Home none   Do you have any problems affording any of your prescribed medications? TBD   Is the patient taking medications as prescribed? yes   Does the patient have transportation home? Yes   Transportation Anticipated car, drives self   Does the patient receive services at the [facility]? No   Discharge Plan A Home   Discharge Plan B Home   DME Needed Upon Discharge  none   Patient/Family in Agreement with Plan yes

## 2019-02-14 NOTE — SUBJECTIVE & OBJECTIVE
"Past Medical History:   Diagnosis Date    Anticoagulant long-term use     Arthritis     CAD (coronary artery disease)     DVT (deep venous thrombosis)     Encounter for blood transfusion     GI bleed     Hypertension     PE (pulmonary thromboembolism)     Stroke     TIA       Past Surgical History:   Procedure Laterality Date    APPENDECTOMY      CARDIAC CATHETERIZATION      2 stents    CHOLECYSTECTOMY      FOOT SURGERY Bilateral     JOINT REPLACEMENT      right    SINUS SURGERY         Review of patient's allergies indicates:   Allergen Reactions    Sulfa (sulfonamide antibiotics)        Family History     Problem Relation (Age of Onset)    Colon cancer Sister    Pancreatic cancer Mother    Stroke Father        Tobacco Use    Smoking status: Former Smoker     Packs/day: 1.00     Years: 10.00     Pack years: 10.00     Types: Cigarettes     Last attempt to quit: 1973     Years since quittin.0    Smokeless tobacco: Never Used   Substance and Sexual Activity    Alcohol use: No     Frequency: Never    Drug use: No    Sexual activity: Not on file         Review of Systems   Constitutional: Positive for fatigue.   Respiratory: Positive for shortness of breath (with exertion only).    Gastrointestinal: Positive for blood in stool and diarrhea. Negative for abdominal distention and vomiting.        Abdominal "soreness"   Musculoskeletal: Negative.    Skin: Negative for pallor.   Hematological: Bruises/bleeds easily.   Psychiatric/Behavioral: Negative.      Objective:     Vital Signs (Most Recent):  Temp: 98.5 °F (36.9 °C) (19 0830)  Pulse: 103 (19 1030)  Resp: 19 (19 1030)  BP: (!) 96/41 (19 1030)  SpO2: 100 % (19 1030) Vital Signs (24h Range):  Temp:  [97.6 °F (36.4 °C)-98.9 °F (37.2 °C)] 98.5 °F (36.9 °C)  Pulse:  [] 103  Resp:  [11-24] 19  SpO2:  [96 %-100 %] 100 %  BP: ()/(17-89) 96/41     Weight: 62.2 kg (137 lb 2 oz)  Body mass index is 22.82 " kg/m².      Intake/Output Summary (Last 24 hours) at 2/14/2019 1105  Last data filed at 2/14/2019 1000  Gross per 24 hour   Intake 6666.5 ml   Output --   Net 6666.5 ml       Physical Exam   Constitutional: She is oriented to person, place, and time. She appears well-developed and well-nourished. She is cooperative. She does not have a sickly appearance. No distress. Nasal cannula in place.   HENT:   Head: Atraumatic.   Eyes: Conjunctivae are normal. Pupils are equal, round, and reactive to light.   Neck: No JVD present. Carotid bruit is not present.   Cardiovascular: Regular rhythm. Tachycardia present.   Murmur heard.   Systolic murmur is present.  Pulses:       Radial pulses are 2+ on the right side, and 2+ on the left side.   Pulmonary/Chest: Effort normal. She has no wheezes. She has no rhonchi. She has no rales.   Abdominal: Soft. She exhibits no distension. There is no tenderness.   Musculoskeletal: She exhibits edema (trace, non pitting).   Neurological: She is alert and oriented to person, place, and time. GCS eye subscore is 4. GCS verbal subscore is 5. GCS motor subscore is 6.   Skin: Skin is warm and dry. Capillary refill takes 2 to 3 seconds. No cyanosis.   Psychiatric: She has a normal mood and affect. Her speech is normal and behavior is normal. Thought content normal. Cognition and memory are normal.       Vents:  Oxygen Concentration (%): 28 (02/14/19 0836)    Lines/Drains/Airways     Peripheral Intravenous Line                 Peripheral IV - Single Lumen 02/13/19 1746 Right Antecubital less than 1 day         Peripheral IV - Single Lumen 02/14/19 0700 Left Forearm less than 1 day                Significant Labs:    CBC/Anemia Profile:  Recent Labs   Lab 02/13/19  1742 02/13/19  2019 02/14/19  0040 02/14/19  0829   WBC 6.73  --   --   --    HGB 11.0* 8.3* 7.5* 9.7*   HCT 33.3* 25.1*  25.1* 22.5* 29.0*     --   --   --    MCV 94  --   --   --    RDW 12.5  --   --   --          Chemistries:  Recent Labs   Lab 02/13/19  1742      K 4.4      CO2 20*   BUN 20   CREATININE 1.0   CALCIUM 9.4   ALBUMIN 3.8   PROT 6.3   BILITOT 0.4   ALKPHOS 60   ALT 19   AST 39       Coagulation:   Recent Labs   Lab 02/13/19  1742   INR 1.0   APTT 21.9     All pertinent labs within the past 24 hours have been reviewed.    Significant Imaging:   I have reviewed all pertinent imaging results/findings within the past 24 hours.

## 2019-02-14 NOTE — HOSPITAL COURSE
H/h holding post transfusion, continued BMs with ongoing Golytely prep are reported dark blood; BP stabilized overnight now decreasing with HR low 100s  2/15 - atrial fib with RVR associated with hypotension and decreased H/H 7.9/23.3; given IVF and 1u PRBC; report of small amount bright red blood from rectum today, H/H holding 10.8/31.8 and BP holding after diuresis

## 2019-02-14 NOTE — H&P (VIEW-ONLY)
[facility] -   Gastroenterology  Consult Note    Patient Name: Tanesha Beard  MRN: [id number]  Admission Date: 2/13/2019  Hospital Length of Stay: 0 days  Code Status: Full Code   Attending Provider: Abdoul Rodriges Jr., MD   Consulting Provider: Sheree Cheney MD  Primary Care Physician: Primary Doctor No  Principal Problem:<principal problem not specified>    Inpatient consult to Gastroenterology  Consult performed by: Sheree Cheney MD  Consult ordered by: Abdoul Rodriges Jr., MD  Reason for consult: LGIB  Assessment/Recommendations: Platelet and blood transfusion        Subjective:     HPI:  78F on ASA and plavix had colonoscopy last week while remaining on ASA and Plavix with polypectomy x4 now today developed rectal bleeding. She remains hemodynamically stable with normal BUN/cr, but has continued with hematochezia. Hb ~11 now down to ~8.  She has some abdominal soreness but no abdominal pain. Per reports she did not have diverticula. Of note she had cardiac stents placed a year ago in Jan. She denies any CP. She is feeling better than when she first arrived.    Spoke with Dr. Skaggs: Polyps 2 in cecum, 43cm and 40cm in descending with hot biopsy forceps reports there were all small sessile polyps.    Past Medical History:   Diagnosis Date    CAD (coronary artery disease)     DVT (deep venous thrombosis)     GI bleed     PE (pulmonary thromboembolism)        No past surgical history on file.    Review of patient's allergies indicates:  No Known Allergies  Family History     None        Tobacco Use    Smoking status: Not on file   Substance and Sexual Activity    Alcohol use: No     Frequency: Never    Drug use: No    Sexual activity: Not on file     Review of Systems   Constitutional: Positive for activity change.   HENT: Negative.    Eyes: Negative.    Respiratory: Negative.    Cardiovascular: Negative.    Gastrointestinal: Positive for blood in stool.   Genitourinary: Negative.     Musculoskeletal: Negative.    Skin: Negative.    Neurological: Positive for weakness.   Psychiatric/Behavioral: Negative.      Objective:     Vital Signs (Most Recent):  Temp: 98.2 °F (36.8 °C) (02/13/19 2036)  Pulse: 79 (02/13/19 2049)  Resp: 16 (02/13/19 2049)  BP: (!) 123/59 (02/13/19 2049)  SpO2: 100 % (02/13/19 2049) Vital Signs (24h Range):  Temp:  [97.6 °F (36.4 °C)-98.2 °F (36.8 °C)] 98.2 °F (36.8 °C)  Pulse:  [72-86] 79  Resp:  [14-20] 16  SpO2:  [97 %-100 %] 100 %  BP: (114-156)/(57-82) 123/59     Weight: 63.5 kg (140 lb) (02/13/19 2116)  There is no height or weight on file to calculate BMI.      Intake/Output Summary (Last 24 hours) at 2/13/2019 2136  Last data filed at 2/13/2019 2120  Gross per 24 hour   Intake 1000 ml   Output --   Net 1000 ml       Lines/Drains/Airways     Peripheral Intravenous Line                 Peripheral IV - Single Lumen 02/13/19 1744 Left Antecubital less than 1 day         Peripheral IV - Single Lumen 02/13/19 1746 Right Antecubital less than 1 day                Physical Exam   Constitutional: She is oriented to person, place, and time. She appears well-developed and well-nourished. No distress.   HENT:   Head: Normocephalic and atraumatic.   Mouth/Throat: Oropharynx is clear and moist. No oropharyngeal exudate.   Eyes: Conjunctivae are normal. Pupils are equal, round, and reactive to light. Right eye exhibits no discharge. Left eye exhibits no discharge. No scleral icterus.   Pulmonary/Chest: Effort normal and breath sounds normal. No respiratory distress. She has no wheezes.   Abdominal: Soft. She exhibits no distension. There is no tenderness.   Genitourinary:   Genitourinary Comments: hematochezia   Musculoskeletal: She exhibits no edema.   Neurological: She is alert and oriented to person, place, and time.   Psychiatric: She has a normal mood and affect. Her behavior is normal.   Vitals reviewed.      Significant Labs:  CBC:   Recent Labs   Lab 02/13/19  1742  02/13/19  2019   WBC 6.73  --    HGB 11.0* 8.3*   HCT 33.3* 25.1*  25.1*     --      CMP:   Recent Labs   Lab 02/13/19  1742   *   CALCIUM 9.4   ALBUMIN 3.8   PROT 6.3      K 4.4   CO2 20*      BUN 20   CREATININE 1.0   ALKPHOS 60   ALT 19   AST 39   BILITOT 0.4     Coagulation:   Recent Labs   Lab 02/13/19  1742   INR 1.0   APTT 21.9       Significant Imaging:  Imaging results within the past 24 hours have been reviewed.    Assessment/Plan:     Lower GI bleed    Suspect post polypectomy bleed that started in setting of antiplatelet medications. Patient is hemodynamically stable but has continued hematochezia, so uncertain if hb drop is reflective of previous bleeding vs some ongoing blood loss. Typically these bleeds are self limited. There may be increased risk of ongoing bleeding given her use of Plavix and ASA. Case has been discussed with patient who agrees to proceeding with platelet therapy despite stents given concern for active bleeding.  -Give platelets to help with hemostasis  -Transfuse 1 unit pRBCs and then repeat HB  -ICU needed  -Check Hb q4hr  -Maintain NPO  -Give colon prep with 4L melina in case colonoscopy needed; need colon prepped in order to perform adequate colonoscopy for bleeding evaluation and management  -If develops hemodynamic instability or concern for significant on going bleeding despite platelets, recommend additional blood transfusion, STAT CTA, and notify me in case colonoscopy has to be tried sooner.  -Case discussed with ED attending and hospital medicine         Thank you for your consult. I will follow-up with patient. Please contact us if you have any additional questions.    Sheree Cheney MD  Gastroenterology  [facility]

## 2019-02-14 NOTE — CONSULTS
[facility] -   Gastroenterology  Consult Note    Patient Name: Tanesha Beard  MRN: [id number]  Admission Date: 2/13/2019  Hospital Length of Stay: 0 days  Code Status: Full Code   Attending Provider: Abdoul Rodriges Jr., MD   Consulting Provider: Sheree Cheney MD  Primary Care Physician: Primary Doctor No  Principal Problem:<principal problem not specified>    Inpatient consult to Gastroenterology  Consult performed by: Sheree Cheney MD  Consult ordered by: Abdoul Rodriges Jr., MD  Reason for consult: LGIB  Assessment/Recommendations: Platelet and blood transfusion        Subjective:     HPI:  78F on ASA and plavix had colonoscopy last week while remaining on ASA and Plavix with polypectomy x4 now today developed rectal bleeding. She remains hemodynamically stable with normal BUN/cr, but has continued with hematochezia. Hb ~11 now down to ~8.  She has some abdominal soreness but no abdominal pain. Per reports she did not have diverticula. Of note she had cardiac stents placed a year ago in Jan. She denies any CP. She is feeling better than when she first arrived.    Spoke with Dr. Skaggs: Polyps 2 in cecum, 43cm and 40cm in descending with hot biopsy forceps reports there were all small sessile polyps.    Past Medical History:   Diagnosis Date    CAD (coronary artery disease)     DVT (deep venous thrombosis)     GI bleed     PE (pulmonary thromboembolism)        No past surgical history on file.    Review of patient's allergies indicates:  No Known Allergies  Family History     None        Tobacco Use    Smoking status: Not on file   Substance and Sexual Activity    Alcohol use: No     Frequency: Never    Drug use: No    Sexual activity: Not on file     Review of Systems   Constitutional: Positive for activity change.   HENT: Negative.    Eyes: Negative.    Respiratory: Negative.    Cardiovascular: Negative.    Gastrointestinal: Positive for blood in stool.   Genitourinary: Negative.     Musculoskeletal: Negative.    Skin: Negative.    Neurological: Positive for weakness.   Psychiatric/Behavioral: Negative.      Objective:     Vital Signs (Most Recent):  Temp: 98.2 °F (36.8 °C) (02/13/19 2036)  Pulse: 79 (02/13/19 2049)  Resp: 16 (02/13/19 2049)  BP: (!) 123/59 (02/13/19 2049)  SpO2: 100 % (02/13/19 2049) Vital Signs (24h Range):  Temp:  [97.6 °F (36.4 °C)-98.2 °F (36.8 °C)] 98.2 °F (36.8 °C)  Pulse:  [72-86] 79  Resp:  [14-20] 16  SpO2:  [97 %-100 %] 100 %  BP: (114-156)/(57-82) 123/59     Weight: 63.5 kg (140 lb) (02/13/19 2116)  There is no height or weight on file to calculate BMI.      Intake/Output Summary (Last 24 hours) at 2/13/2019 2136  Last data filed at 2/13/2019 2120  Gross per 24 hour   Intake 1000 ml   Output --   Net 1000 ml       Lines/Drains/Airways     Peripheral Intravenous Line                 Peripheral IV - Single Lumen 02/13/19 1744 Left Antecubital less than 1 day         Peripheral IV - Single Lumen 02/13/19 1746 Right Antecubital less than 1 day                Physical Exam   Constitutional: She is oriented to person, place, and time. She appears well-developed and well-nourished. No distress.   HENT:   Head: Normocephalic and atraumatic.   Mouth/Throat: Oropharynx is clear and moist. No oropharyngeal exudate.   Eyes: Conjunctivae are normal. Pupils are equal, round, and reactive to light. Right eye exhibits no discharge. Left eye exhibits no discharge. No scleral icterus.   Pulmonary/Chest: Effort normal and breath sounds normal. No respiratory distress. She has no wheezes.   Abdominal: Soft. She exhibits no distension. There is no tenderness.   Genitourinary:   Genitourinary Comments: hematochezia   Musculoskeletal: She exhibits no edema.   Neurological: She is alert and oriented to person, place, and time.   Psychiatric: She has a normal mood and affect. Her behavior is normal.   Vitals reviewed.      Significant Labs:  CBC:   Recent Labs   Lab 02/13/19  1742  02/13/19  2019   WBC 6.73  --    HGB 11.0* 8.3*   HCT 33.3* 25.1*  25.1*     --      CMP:   Recent Labs   Lab 02/13/19  1742   *   CALCIUM 9.4   ALBUMIN 3.8   PROT 6.3      K 4.4   CO2 20*      BUN 20   CREATININE 1.0   ALKPHOS 60   ALT 19   AST 39   BILITOT 0.4     Coagulation:   Recent Labs   Lab 02/13/19  1742   INR 1.0   APTT 21.9       Significant Imaging:  Imaging results within the past 24 hours have been reviewed.    Assessment/Plan:     Lower GI bleed    Suspect post polypectomy bleed that started in setting of antiplatelet medications. Patient is hemodynamically stable but has continued hematochezia, so uncertain if hb drop is reflective of previous bleeding vs some ongoing blood loss. Typically these bleeds are self limited. There may be increased risk of ongoing bleeding given her use of Plavix and ASA. Case has been discussed with patient who agrees to proceeding with platelet therapy despite stents given concern for active bleeding.  -Give platelets to help with hemostasis  -Transfuse 1 unit pRBCs and then repeat HB  -ICU needed  -Check Hb q4hr  -Maintain NPO  -Give colon prep with 4L melina in case colonoscopy needed; need colon prepped in order to perform adequate colonoscopy for bleeding evaluation and management  -If develops hemodynamic instability or concern for significant on going bleeding despite platelets, recommend additional blood transfusion, STAT CTA, and notify me in case colonoscopy has to be tried sooner.  -Case discussed with ED attending and hospital medicine         Thank you for your consult. I will follow-up with patient. Please contact us if you have any additional questions.    Sheree Cheney MD  Gastroenterology  [facility]

## 2019-02-14 NOTE — INTERVAL H&P NOTE
The patient has been examined and the H&P has been reviewed:    I concur with the findings and changes have been noted since the H&P was written: Patient with continued bleeding and hemoglobin drop along with BP drop. She is being bolused and given additional unit of blood with improvement in her BP. She feels weak and nauseous.    Anesthesia/Surgery risks, benefits and alternative options discussed and understood by patient/family.          Active Hospital Problems    Diagnosis  POA    *Acute lower GI bleeding [K92.2]  Yes    CAD (coronary artery disease) [I25.10]  Yes      Resolved Hospital Problems   No resolved problems to display.       Proceed with colonoscopy for continued GIB with BP drop now more stabilized but with ongoing bleeding. Plan discussed with hospital medicine team who was also going to get surgery on board.  Concerned about ability to visualize unprepped colon as patient only able to complete small part of prep. Higher risk of procedure explained to patient given BP issues and active bleeding, along with her age, medical co-morbidities and ASA/Plavix use. She expressed understanding and ok to proceed. She is full code.

## 2019-02-14 NOTE — SUBJECTIVE & OBJECTIVE
Past Medical History:   Diagnosis Date    CAD (coronary artery disease)     DVT (deep venous thrombosis)     GI bleed     PE (pulmonary thromboembolism)        No past surgical history on file.    Review of patient's allergies indicates:  No Known Allergies  Family History     None        Tobacco Use    Smoking status: Not on file   Substance and Sexual Activity    Alcohol use: No     Frequency: Never    Drug use: No    Sexual activity: Not on file     Review of Systems   Constitutional: Positive for activity change.   HENT: Negative.    Eyes: Negative.    Respiratory: Negative.    Cardiovascular: Negative.    Gastrointestinal: Positive for blood in stool.   Genitourinary: Negative.    Musculoskeletal: Negative.    Skin: Negative.    Neurological: Positive for weakness.   Psychiatric/Behavioral: Negative.      Objective:     Vital Signs (Most Recent):  Temp: 98.2 °F (36.8 °C) (02/13/19 2036)  Pulse: 79 (02/13/19 2049)  Resp: 16 (02/13/19 2049)  BP: (!) 123/59 (02/13/19 2049)  SpO2: 100 % (02/13/19 2049) Vital Signs (24h Range):  Temp:  [97.6 °F (36.4 °C)-98.2 °F (36.8 °C)] 98.2 °F (36.8 °C)  Pulse:  [72-86] 79  Resp:  [14-20] 16  SpO2:  [97 %-100 %] 100 %  BP: (114-156)/(57-82) 123/59     Weight: 63.5 kg (140 lb) (02/13/19 2116)  There is no height or weight on file to calculate BMI.      Intake/Output Summary (Last 24 hours) at 2/13/2019 2136  Last data filed at 2/13/2019 2120  Gross per 24 hour   Intake 1000 ml   Output --   Net 1000 ml       Lines/Drains/Airways     Peripheral Intravenous Line                 Peripheral IV - Single Lumen 02/13/19 1744 Left Antecubital less than 1 day         Peripheral IV - Single Lumen 02/13/19 1746 Right Antecubital less than 1 day                Physical Exam   Constitutional: She is oriented to person, place, and time. She appears well-developed and well-nourished. No distress.   HENT:   Head: Normocephalic and atraumatic.   Mouth/Throat: Oropharynx is clear and  moist. No oropharyngeal exudate.   Eyes: Conjunctivae are normal. Pupils are equal, round, and reactive to light. Right eye exhibits no discharge. Left eye exhibits no discharge. No scleral icterus.   Pulmonary/Chest: Effort normal and breath sounds normal. No respiratory distress. She has no wheezes.   Abdominal: Soft. She exhibits no distension. There is no tenderness.   Genitourinary:   Genitourinary Comments: hematochezia   Musculoskeletal: She exhibits no edema.   Neurological: She is alert and oriented to person, place, and time.   Psychiatric: She has a normal mood and affect. Her behavior is normal.   Vitals reviewed.      Significant Labs:  CBC:   Recent Labs   Lab 02/13/19 1742 02/13/19 2019   WBC 6.73  --    HGB 11.0* 8.3*   HCT 33.3* 25.1*  25.1*     --      CMP:   Recent Labs   Lab 02/13/19 1742   *   CALCIUM 9.4   ALBUMIN 3.8   PROT 6.3      K 4.4   CO2 20*      BUN 20   CREATININE 1.0   ALKPHOS 60   ALT 19   AST 39   BILITOT 0.4     Coagulation:   Recent Labs   Lab 02/13/19 1742   INR 1.0   APTT 21.9       Significant Imaging:  Imaging results within the past 24 hours have been reviewed.

## 2019-02-14 NOTE — NURSING
Dr. Cheney and team in the room. Pt care transferred to them during the procedure. Will assist in any way needed.

## 2019-02-14 NOTE — ASSESSMENT & PLAN NOTE
Suspect post polypectomy bleed that started in setting of antiplatelet medications. Patient is hemodynamically stable but has continued hematochezia, so uncertain if hb drop is reflective of previous bleeding vs some ongoing blood loss. Typically these bleeds are self limited. There may be increased risk of ongoing bleeding given her use of Plavix and ASA. Case has been discussed with patient who agrees to proceeding with platelet therapy despite stents given concern for active bleeding.  -Give platelets to help with hemostasis  -Transfuse 1 unit pRBCs and then repeat HB  -ICU needed  -Check Hb q4hr  -Maintain NPO  -Give colon prep with 4L melina in case colonoscopy needed; need colon prepped in order to perform adequate colonoscopy for bleeding evaluation and management  -If develops hemodynamic instability or concern for significant on going bleeding despite platelets, recommend additional blood transfusion, STAT CTA, and notify me in case colonoscopy has to be tried sooner.  -Case discussed with ED attending and hospital medicine

## 2019-02-15 ENCOUNTER — TELEPHONE (OUTPATIENT)
Dept: GASTROENTEROLOGY | Facility: CLINIC | Age: 79
End: 2019-02-15

## 2019-02-15 PROBLEM — E87.8 ELECTROLYTE IMBALANCE: Status: ACTIVE | Noted: 2019-02-15

## 2019-02-15 LAB
ALBUMIN SERPL BCP-MCNC: 2.5 G/DL
ALP SERPL-CCNC: 34 U/L
ALT SERPL W/O P-5'-P-CCNC: 11 U/L
ANION GAP SERPL CALC-SCNC: 5 MMOL/L
AST SERPL-CCNC: 33 U/L
BASOPHILS # BLD AUTO: 0.01 K/UL
BASOPHILS NFR BLD: 0.2 %
BILIRUB SERPL-MCNC: 0.5 MG/DL
BUN SERPL-MCNC: 7 MG/DL
CALCIUM SERPL-MCNC: 7.8 MG/DL
CHLORIDE SERPL-SCNC: 114 MMOL/L
CO2 SERPL-SCNC: 23 MMOL/L
CREAT SERPL-MCNC: 0.7 MG/DL
DIFFERENTIAL METHOD: ABNORMAL
EOSINOPHIL # BLD AUTO: 0.1 K/UL
EOSINOPHIL NFR BLD: 2.4 %
ERYTHROCYTE [DISTWIDTH] IN BLOOD BY AUTOMATED COUNT: 15.7 %
EST. GFR  (AFRICAN AMERICAN): >60 ML/MIN/1.73 M^2
EST. GFR  (NON AFRICAN AMERICAN): >60 ML/MIN/1.73 M^2
GLUCOSE SERPL-MCNC: 102 MG/DL
HCT VFR BLD AUTO: 26.3 %
HCT VFR BLD AUTO: 27.6 %
HCT VFR BLD AUTO: 30.1 %
HCT VFR BLD AUTO: 31.8 %
HGB BLD-MCNC: 10.1 G/DL
HGB BLD-MCNC: 10.8 G/DL
HGB BLD-MCNC: 9.1 G/DL
HGB BLD-MCNC: 9.3 G/DL
LYMPHOCYTES # BLD AUTO: 1 K/UL
LYMPHOCYTES NFR BLD: 17.4 %
MAGNESIUM SERPL-MCNC: 2.1 MG/DL
MCH RBC QN AUTO: 30 PG
MCHC RBC AUTO-ENTMCNC: 34.6 G/DL
MCV RBC AUTO: 87 FL
MONOCYTES # BLD AUTO: 0.8 K/UL
MONOCYTES NFR BLD: 12.9 %
NEUTROPHILS # BLD AUTO: 3.9 K/UL
NEUTROPHILS NFR BLD: 67.1 %
PHOSPHATE SERPL-MCNC: 1.7 MG/DL
PLATELET # BLD AUTO: 94 K/UL
PMV BLD AUTO: 9.3 FL
POCT GLUCOSE: 98 MG/DL (ref 70–110)
POTASSIUM SERPL-SCNC: 3.3 MMOL/L
PROT SERPL-MCNC: 4.2 G/DL
RBC # BLD AUTO: 3.03 M/UL
SODIUM SERPL-SCNC: 142 MMOL/L
TROPONIN I SERPL DL<=0.01 NG/ML-MCNC: 0.01 NG/ML
WBC # BLD AUTO: 5.81 K/UL

## 2019-02-15 PROCEDURE — 21400001 HC TELEMETRY ROOM

## 2019-02-15 PROCEDURE — 85018 HEMOGLOBIN: CPT | Mod: 91

## 2019-02-15 PROCEDURE — 36415 COLL VENOUS BLD VENIPUNCTURE: CPT

## 2019-02-15 PROCEDURE — 99233 SBSQ HOSP IP/OBS HIGH 50: CPT | Mod: ,,, | Performed by: NURSE PRACTITIONER

## 2019-02-15 PROCEDURE — 25000242 PHARM REV CODE 250 ALT 637 W/ HCPCS: Performed by: HOSPITALIST

## 2019-02-15 PROCEDURE — 36600 WITHDRAWAL OF ARTERIAL BLOOD: CPT

## 2019-02-15 PROCEDURE — 25000003 PHARM REV CODE 250: Performed by: NURSE PRACTITIONER

## 2019-02-15 PROCEDURE — 85018 HEMOGLOBIN: CPT

## 2019-02-15 PROCEDURE — 99233 PR SUBSEQUENT HOSPITAL CARE,LEVL III: ICD-10-PCS | Mod: ,,, | Performed by: INTERNAL MEDICINE

## 2019-02-15 PROCEDURE — 82803 BLOOD GASES ANY COMBINATION: CPT

## 2019-02-15 PROCEDURE — 84100 ASSAY OF PHOSPHORUS: CPT

## 2019-02-15 PROCEDURE — 80053 COMPREHEN METABOLIC PANEL: CPT

## 2019-02-15 PROCEDURE — C9113 INJ PANTOPRAZOLE SODIUM, VIA: HCPCS | Performed by: EMERGENCY MEDICINE

## 2019-02-15 PROCEDURE — 85014 HEMATOCRIT: CPT | Mod: 91

## 2019-02-15 PROCEDURE — 99233 SBSQ HOSP IP/OBS HIGH 50: CPT | Mod: ,,, | Performed by: INTERNAL MEDICINE

## 2019-02-15 PROCEDURE — 99233 PR SUBSEQUENT HOSPITAL CARE,LEVL III: ICD-10-PCS | Mod: ,,, | Performed by: NURSE PRACTITIONER

## 2019-02-15 PROCEDURE — 85025 COMPLETE CBC W/AUTO DIFF WBC: CPT

## 2019-02-15 PROCEDURE — 63600175 PHARM REV CODE 636 W HCPCS: Performed by: NURSE PRACTITIONER

## 2019-02-15 PROCEDURE — 85014 HEMATOCRIT: CPT

## 2019-02-15 PROCEDURE — 25000242 PHARM REV CODE 250 ALT 637 W/ HCPCS: Performed by: INTERNAL MEDICINE

## 2019-02-15 PROCEDURE — 83735 ASSAY OF MAGNESIUM: CPT

## 2019-02-15 PROCEDURE — 94640 AIRWAY INHALATION TREATMENT: CPT

## 2019-02-15 PROCEDURE — 99900035 HC TECH TIME PER 15 MIN (STAT)

## 2019-02-15 PROCEDURE — 63600175 PHARM REV CODE 636 W HCPCS: Performed by: EMERGENCY MEDICINE

## 2019-02-15 RX ORDER — ACETAMINOPHEN 325 MG/1
650 TABLET ORAL EVERY 8 HOURS PRN
Status: DISCONTINUED | OUTPATIENT
Start: 2019-02-15 | End: 2019-02-16 | Stop reason: HOSPADM

## 2019-02-15 RX ORDER — METOPROLOL TARTRATE 25 MG/1
25 TABLET, FILM COATED ORAL 2 TIMES DAILY
Status: DISCONTINUED | OUTPATIENT
Start: 2019-02-15 | End: 2019-02-16 | Stop reason: HOSPADM

## 2019-02-15 RX ORDER — IPRATROPIUM BROMIDE AND ALBUTEROL SULFATE 2.5; .5 MG/3ML; MG/3ML
3 SOLUTION RESPIRATORY (INHALATION)
Status: DISCONTINUED | OUTPATIENT
Start: 2019-02-15 | End: 2019-02-16 | Stop reason: HOSPADM

## 2019-02-15 RX ORDER — FUROSEMIDE 10 MG/ML
20 INJECTION INTRAMUSCULAR; INTRAVENOUS ONCE
Status: COMPLETED | OUTPATIENT
Start: 2019-02-15 | End: 2019-02-15

## 2019-02-15 RX ADMIN — POTASSIUM PHOSPHATE, MONOBASIC AND POTASSIUM PHOSPHATE, DIBASIC 30 MMOL: 224; 236 INJECTION, SOLUTION, CONCENTRATE INTRAVENOUS at 09:02

## 2019-02-15 RX ADMIN — ACETAMINOPHEN 650 MG: 325 TABLET ORAL at 07:02

## 2019-02-15 RX ADMIN — IPRATROPIUM BROMIDE AND ALBUTEROL SULFATE 3 ML: .5; 3 SOLUTION RESPIRATORY (INHALATION) at 04:02

## 2019-02-15 RX ADMIN — FUROSEMIDE 20 MG: 10 INJECTION, SOLUTION INTRAVENOUS at 09:02

## 2019-02-15 RX ADMIN — IPRATROPIUM BROMIDE AND ALBUTEROL SULFATE 3 ML: .5; 3 SOLUTION RESPIRATORY (INHALATION) at 12:02

## 2019-02-15 RX ADMIN — IPRATROPIUM BROMIDE AND ALBUTEROL SULFATE 3 ML: .5; 3 SOLUTION RESPIRATORY (INHALATION) at 08:02

## 2019-02-15 RX ADMIN — PANTOPRAZOLE SODIUM 40 MG: 40 INJECTION, POWDER, LYOPHILIZED, FOR SOLUTION INTRAVENOUS at 09:02

## 2019-02-15 RX ADMIN — METOPROLOL TARTRATE 25 MG: 25 TABLET ORAL at 08:02

## 2019-02-15 NOTE — PROGRESS NOTES
[facility] Hospital Medicine  Progress Note    Patient Name: Tanesha Beard  MRN: [id number]  Patient Class: IP- Inpatient   Admission Date: 2/13/2019  Length of Stay: 2 days  Attending Physician: Mary Crowe MD  Primary Care Provider: Primary Doctor No        Subjective:     Principal Problem:Acute blood loss anemia    HPI:  78F h/o CAD s/p LARRY, HTN, and HLD presents with hematochezia.  Large amount maroon colored blood passed in son's driveway and son called 911.  In ER, patient continues to bleed, bright red blood mixed with maroon colored blood clots.  She was type and screened stat.    Hemodynamically stable.  Hb 11 on arrival.  Dr. Cheney (GI) was called in ER and recommended transfusing platelets.    Patient denies any fever, chills, N/V, abd pain, diarrhea, constipation, and all other sxs at this time. Pt had a colonoscopy by general surgery last week and had a few polyps removed. Pt takes ASA and Plavix daily and was not told to discontinue these medications during colonoscopy.  Patient is on Plavix for coronary stent placed in January 2018.  Dr. Rebollar in [place] performed colonoscopy last week.  His cell is [phone].         Hospital Course:  Pt taken for Colonoscipy which revealed post polypectomy bleed in cecum but insufficient bowel prep did not permit exam of all reported polypectomy sites. Pt given 4 UPRBCS, 3Plts, 1 cryo.   Per GI,     Suspect post polypectomy bleed that started in setting of antiplatelet medications. Patient is hemodynamically stable but has continued hematochezia, so uncertain if hb drop is reflective of previous bleeding vs some ongoing blood loss. Typically these bleeds are self limited. There may be increased risk of ongoing bleeding given her use of Plavix and ASA. Case has been discussed with patient who agrees to proceeding with platelet therapy despite stents given concern for active bleeding.     With stent, last [facility] 1/2018 2/14: pt  now given 4L melina in case colonoscopy needed; need colon prepped in order to perform adequate colonoscopy for bleeding evaluation and management. If pt develops hemodynamic instability or concern for significant on going bleeding despite platelets, recommend additional blood transfusion, STAT CTA, and notify GI in case colonoscopy has to be tried sooner  2/15: Pt now passing small amount of bright red blood. Pt was given 1 U PRBC overnight. GI will continue to monitor. No plans for rescoping yet.    Interval History:small amount of bright red blood per rectum  Review of Systems   Constitutional: Negative for chills, diaphoresis, fatigue, fever and unexpected weight change.   HENT: Negative for congestion, facial swelling, sore throat and trouble swallowing.    Eyes: Negative for photophobia, redness and visual disturbance.   Respiratory: Negative for apnea, cough, chest tightness, shortness of breath and wheezing.    Cardiovascular: Negative for chest pain, palpitations and leg swelling.   Gastrointestinal: Positive for anal bleeding. Negative for abdominal distention, abdominal pain, blood in stool, constipation, diarrhea, nausea and vomiting.   Endocrine: Negative for polydipsia, polyphagia and polyuria.   Genitourinary: Negative for difficulty urinating, dysuria, flank pain, frequency, hematuria and urgency.   Musculoskeletal: Negative for arthralgias, back pain, joint swelling, myalgias and neck stiffness.   Skin: Negative for pallor and rash.   Allergic/Immunologic: Negative for immunocompromised state.   Neurological: Negative for dizziness, tremors, syncope, weakness, light-headedness and headaches.   Psychiatric/Behavioral: Negative for agitation, confusion and suicidal ideas.   All other systems reviewed and are negative.      Objective:     Vital Signs (Most Recent):  Temp: (!) 100.4 °F (38 °C) (02/15/19 1600)  Pulse: 90 (02/15/19 1700)  Resp: (!) 21 (02/15/19 1700)  BP: (!) 147/78 (02/15/19  1700)  SpO2: 97 % (02/15/19 1700) Vital Signs (24h Range):  Temp:  [98 °F (36.7 °C)-100.5 °F (38.1 °C)] 100.4 °F (38 °C)  Pulse:  [] 90  Resp:  [8-22] 21  SpO2:  [89 %-99 %] 97 %  BP: (102-153)/(42-85) 147/78     Weight: 62.2 kg (137 lb 2 oz)  Body mass index is 22.82 kg/m².    Intake/Output Summary (Last 24 hours) at 2/15/2019 1721  Last data filed at 2/15/2019 1700  Gross per 24 hour   Intake 3200 ml   Output 3150 ml   Net 50 ml      Physical Exam   Constitutional: She is oriented to person, place, and time. She appears well-developed and well-nourished. No distress.   HENT:   Head: Normocephalic and atraumatic.   Mouth/Throat: Oropharynx is clear and moist.   Eyes: Conjunctivae and EOM are normal. Pupils are equal, round, and reactive to light. No scleral icterus.   Neck: Normal range of motion. Neck supple. No JVD present. No thyromegaly present.   Cardiovascular: Normal rate, regular rhythm and intact distal pulses. Exam reveals no gallop and no friction rub.   No murmur heard.  Pulmonary/Chest: Effort normal and breath sounds normal. No respiratory distress. She has no wheezes. She has no rales. She exhibits no tenderness.   Abdominal: Soft. Bowel sounds are normal. She exhibits no distension and no mass. There is no tenderness. There is no guarding. Mild lower abdominal tenderness  Musculoskeletal: Normal range of motion. She exhibits no tenderness.   Neurological: She is alert and oriented to person, place, and time. No cranial nerve deficit.   Skin: Skin is warm and dry. Capillary refill takes less than 2 seconds. No rash noted. She is not diaphoretic. No erythema. There is pallor.   Psychiatric: She has a normal mood and affect.   Nursing note and vitals reviewed.       Significant Labs: All pertinent labs within the past 24 hours have been reviewed..      Significant Imaging: I have reviewed all pertinent imaging results/findings within the past 24 hours.    Assessment/Plan:      * Acute blood loss  anemia    S/p 5 UPRBCS, 3Plts, 1 cryo  Monitor H/h q4hr  Gi on board     Acute hyperglycemia           CAD (coronary artery disease)    S/p stent placed in Jan 2018  - hold plavix/asa for active GIB       Acute lower GI bleeding    - Gi on board, possibel cscope after bowel prep  - continue H/H q4h             VTE Risk Mitigation (From admission, onward)        Ordered     IP VTE HIGH RISK PATIENT  Once      02/13/19 2012     Place sequential compression device  Until discontinued      02/13/19 2012     Reason for No Pharmacological VTE Prophylaxis  Once      02/13/19 2012          Critical care time spent on the evaluation and treatment of severe organ dysfunction, review of pertinent labs and imaging studies, discussions with consulting providers and discussions with patient/family: >30 minutes.    Mary Crowe MD  Department of Hospital Medicine   [facility]

## 2019-02-15 NOTE — PLAN OF CARE
Problem: Adult Inpatient Plan of Care  Goal: Plan of Care Review  Outcome: Ongoing (interventions implemented as appropriate)  Vitals signs closely monitored. Fall precautions in place. Pain assessed every two hours. Safety promoted. Infection risks reduced.   Pt up to chair. Ambulating to bathroom. Small amount of bright red blood from rectum when pt would void. ERICK Cabezas informed. Blood has subsided. Will cont to monitor. H and H is stable. No fluctuations in vitals. Weaned O2 to room air. Pt had dependent edema this am. IV lasix given. Pt diuresed well. Pt remained NSR all day. Family updated

## 2019-02-15 NOTE — SUBJECTIVE & OBJECTIVE
Interval History:small amount of bright red blood per rectum  Review of Systems   Constitutional: Negative for chills, diaphoresis, fatigue, fever and unexpected weight change.   HENT: Negative for congestion, facial swelling, sore throat and trouble swallowing.    Eyes: Negative for photophobia, redness and visual disturbance.   Respiratory: Negative for apnea, cough, chest tightness, shortness of breath and wheezing.    Cardiovascular: Negative for chest pain, palpitations and leg swelling.   Gastrointestinal: Positive for anal bleeding. Negative for abdominal distention, abdominal pain, blood in stool, constipation, diarrhea, nausea and vomiting.   Endocrine: Negative for polydipsia, polyphagia and polyuria.   Genitourinary: Negative for difficulty urinating, dysuria, flank pain, frequency, hematuria and urgency.   Musculoskeletal: Negative for arthralgias, back pain, joint swelling, myalgias and neck stiffness.   Skin: Negative for pallor and rash.   Allergic/Immunologic: Negative for immunocompromised state.   Neurological: Negative for dizziness, tremors, syncope, weakness, light-headedness and headaches.   Psychiatric/Behavioral: Negative for agitation, confusion and suicidal ideas.   All other systems reviewed and are negative.      Objective:     Vital Signs (Most Recent):  Temp: (!) 100.4 °F (38 °C) (02/15/19 1600)  Pulse: 90 (02/15/19 1700)  Resp: (!) 21 (02/15/19 1700)  BP: (!) 147/78 (02/15/19 1700)  SpO2: 97 % (02/15/19 1700) Vital Signs (24h Range):  Temp:  [98 °F (36.7 °C)-100.5 °F (38.1 °C)] 100.4 °F (38 °C)  Pulse:  [] 90  Resp:  [8-22] 21  SpO2:  [89 %-99 %] 97 %  BP: (102-153)/(42-85) 147/78     Weight: 62.2 kg (137 lb 2 oz)  Body mass index is 22.82 kg/m².    Intake/Output Summary (Last 24 hours) at 2/15/2019 1721  Last data filed at 2/15/2019 1700  Gross per 24 hour   Intake 3200 ml   Output 3150 ml   Net 50 ml      Physical Exam   Constitutional: She is oriented to person, place, and  time. She appears well-developed and well-nourished. No distress.   HENT:   Head: Normocephalic and atraumatic.   Mouth/Throat: Oropharynx is clear and moist.   Eyes: Conjunctivae and EOM are normal. Pupils are equal, round, and reactive to light. No scleral icterus.   Neck: Normal range of motion. Neck supple. No JVD present. No thyromegaly present.   Cardiovascular: Normal rate, regular rhythm and intact distal pulses. Exam reveals no gallop and no friction rub.   No murmur heard.  Pulmonary/Chest: Effort normal and breath sounds normal. No respiratory distress. She has no wheezes. She has no rales. She exhibits no tenderness.   Abdominal: Soft. Bowel sounds are normal. She exhibits no distension and no mass. There is no tenderness. There is no guarding. Mild lower abdominal tenderness  Musculoskeletal: Normal range of motion. She exhibits no tenderness.   Neurological: She is alert and oriented to person, place, and time. No cranial nerve deficit.   Skin: Skin is warm and dry. Capillary refill takes less than 2 seconds. No rash noted. She is not diaphoretic. No erythema. There is pallor.   Psychiatric: She has a normal mood and affect.   Nursing note and vitals reviewed.       Significant Labs: All pertinent labs within the past 24 hours have been reviewed..      Significant Imaging: I have reviewed all pertinent imaging results/findings within the past 24 hours.

## 2019-02-15 NOTE — PROGRESS NOTES
Notified MD at [facility] of lab results.  Awaiting new orders for Lopressor, K+ r& Mg replacement, and an additional unit of PRBC

## 2019-02-15 NOTE — PROGRESS NOTES
Patient was responsive to Cardizem dose with A-fib  rate dropping to 100-110 but has returned to RVR with rate running in 140's. Awaiting on lab results.

## 2019-02-15 NOTE — EICU
RN discussed about labs.  HR back up to 150.    Plan:  - PRBC one unit over 4 hrs.  Watch for fluid overload  - Kcl 10 meq q1 hr x 3 dose, Mag 2 gm q2 hrs x 2 doses  - IV lopressor 2.5 mg stat.   - get troponin once.

## 2019-02-15 NOTE — ASSESSMENT & PLAN NOTE
Post polypectomy bleed noted in cecum  Required additional prbc overnight  Evidence of bleeding slowed and h/h holding today  Trial soft diet per GI recs  Anticipate d/c home tomorrow if h/h stable with no further bleeding

## 2019-02-15 NOTE — PLAN OF CARE
Problem: Adult Inpatient Plan of Care  Goal: Plan of Care Review  Outcome: Ongoing (interventions implemented as appropriate)  AAOx4, voices no c/o pain. Daughter at bedside per patient's request; daughter actively participating in POC.  Received 1 U PRBC this shift due to drop in H/H to 7.9/23.3 with good results- follow up H/H 9.3/27.6. 30meq K+ and 4GM Mg replaced this shift due to low levels. Converted to Afib with RVR at beginning of shift, attempts to convert with IV push Cardizem and Lopressor only minimally effective. Converted back to NSR around 0130. O2 at 2LPM NC applied at end of shift due to noted decline in O2 sats to high 80's- low 90's. No BM's this shift; scant amount bright red rectal bleeding noted on bed pad x 2 during shift.  Voiding per bedpan. Safety maintained all shift, turns self without difficulty.  Awaiting morning labs.

## 2019-02-15 NOTE — SUBJECTIVE & OBJECTIVE
Review of Systems   Constitutional: Negative for chills and fever.        Reports feeling better, feels ready to eat   HENT: Negative for sore throat.    Respiratory: Negative for shortness of breath.    Cardiovascular: Negative for chest pain and leg swelling.   Gastrointestinal: Positive for blood in stool. Negative for abdominal pain, nausea and vomiting.   Genitourinary: Negative.    Musculoskeletal: Negative.    Skin: Negative.    Neurological: Positive for headaches.   Psychiatric/Behavioral: Negative.        Objective:     Vital Signs (Most Recent):  Temp: 99 °F (37.2 °C) (02/15/19 1127)  Pulse: 100 (02/15/19 1400)  Resp: 16 (02/15/19 1400)  BP: 139/80 (02/15/19 1400)  SpO2: 98 % (02/15/19 1400) Vital Signs (24h Range):  Temp:  [98 °F (36.7 °C)-100.5 °F (38.1 °C)] 99 °F (37.2 °C)  Pulse:  [] 100  Resp:  [8-22] 16  SpO2:  [89 %-100 %] 98 %  BP: ()/(40-85) 139/80     Weight: 62.2 kg (137 lb 2 oz)  Body mass index is 22.82 kg/m².      Intake/Output Summary (Last 24 hours) at 2/15/2019 1429  Last data filed at 2/15/2019 1400  Gross per 24 hour   Intake 3225 ml   Output 2650 ml   Net 575 ml       Physical Exam   Constitutional: She is oriented to person, place, and time. She appears well-developed and well-nourished. She is cooperative. She does not have a sickly appearance. No distress. Nasal cannula in place.   HENT:   Head: Atraumatic.   Eyes: Conjunctivae are normal. Pupils are equal, round, and reactive to light.   Neck: No JVD present. Carotid bruit is not present.   Cardiovascular: Regular rhythm. Tachycardia present.   Murmur heard.   Systolic murmur is present.  Pulses:       Radial pulses are 2+ on the right side, and 2+ on the left side.   Pulmonary/Chest: Effort normal. She has no wheezes. She has no rhonchi. She has no rales.   Abdominal: Soft. She exhibits no distension. There is no tenderness.   Musculoskeletal: She exhibits edema (trace, non pitting).   Neurological: She is alert and  oriented to person, place, and time. GCS eye subscore is 4. GCS verbal subscore is 5. GCS motor subscore is 6.   Skin: Skin is warm and dry. Capillary refill takes 2 to 3 seconds. No cyanosis.   Psychiatric: She has a normal mood and affect. Her speech is normal and behavior is normal. Thought content normal. Cognition and memory are normal.       Vents:  Oxygen Concentration (%): 24 (02/14/19 1139)    Lines/Drains/Airways     Peripheral Intravenous Line                 Peripheral IV - Single Lumen 02/13/19 1746 Right Antecubital 1 day         Peripheral IV - Single Lumen 02/14/19 0700 Left Forearm 1 day                Significant Labs:    CBC/Anemia Profile:  Recent Labs   Lab 02/13/19  1742  02/14/19  2010 02/15/19  0244 02/15/19  0632 02/15/19  1020   WBC 6.73  --  5.48  --  5.81  --    HGB 11.0*   < > 7.9*  7.9* 9.3* 9.1* 10.8*   HCT 33.3*   < > 23.3*  23.3* 27.6* 26.3* 31.8*     --  102*  --  94*  --    MCV 94  --  87  --  87  --    RDW 12.5  --  15.5*  --  15.7*  --     < > = values in this interval not displayed.        Chemistries:  Recent Labs   Lab 02/13/19 1742 02/14/19  1318 02/14/19  2010 02/15/19  0632    145 141 142   K 4.4 3.7 3.1* 3.3*    114* 113* 114*   CO2 20* 21* 21* 23   BUN 20 12 9 7*   CREATININE 1.0 0.8 0.7 0.7   CALCIUM 9.4 7.4* 7.6* 7.8*   ALBUMIN 3.8  --   --  2.5*   PROT 6.3  --   --  4.2*   BILITOT 0.4  --   --  0.5   ALKPHOS 60  --   --  34*   ALT 19  --   --  11   AST 39  --   --  33   MG  --   --  1.2* 2.1   PHOS  --   --   --  1.7*       All pertinent labs within the past 24 hours have been reviewed.    Significant Imaging:  I have reviewed all pertinent imaging results/findings within the past 24 hours.

## 2019-02-15 NOTE — EICU
"Camera Eval for eLERT:    78 yr old female with hx of CAD, s/p LARRY on double antiplatelet therapy underwent colonoscopy by GI for acute LGI bleeding earlier after 4 units PRBC, Cryo and platelets.    Discussed with bed side RN and patient. " Feels bad"  Without diaphoresis or chest pain.  No sob or abdominal pain.  Abdomen is soft. No nausea.  Mentation fine.  .  sats fine.   to 144, a fib ( new onset).    Data:  Reviewed.  Last Hg 8.1, slowly going down. K 3.6.  ECHO not available on epic reports.    A/P:  1. New onset a fib/CAD-s/pDEs hx  2. LGI bleed and blood loss anemia    - Stat Cardizem 10 mg IV once  - BMP, Mg, CBC, EKG stat  - asp precautions  - might need transfusion if HG < 8 . No active bleeding as per GI notes.   - consider ECHO, cardiology eval in AM.   "

## 2019-02-15 NOTE — PROGRESS NOTES
[facility] -   Critical Care Medicine  Progress Note    Patient Name: Tanesha Beard  MRN: [id number]  Admission Date: 2/13/2019  Hospital Length of Stay: 2 days  Code Status: Full Code  Attending Provider: Mary Crowe MD  Primary Care Provider: Primary Doctor No   Principal Problem: Acute blood loss anemia    Subjective:     HPI:  78 year old female visiting son (lives and receives routine care in [place], [place]) with PMH of CAD with stent, HTN, HLD, DVT/PE  Presented to ED on 2/13 with complaint of hematochezia with clots    ED evaluation initial h/h 11/33; glucose 154  GI consulted, plan transfuse platelets, admit ICU, monitor h/h q4    H/H fell with concurrent hypotension and reported ongoing hematochezia with clots requiring total transfusions overnight of 3 plt, 1 cryo, and 4 PRBC  Colonoscopy early am revealed post polypectomy bleed in cecum but insufficient bowel prep did not permit exam of all reported polypectomy sites    Hospital/ICU Course:  H/h holding post transfusion, continued BMs with ongoing Golytely prep are reported dark blood; BP stabilized overnight now decreasing with HR low 100s  2/15 - atrial fib with RVR associated with hypotension and decreased H/H 7.9/23.3; given IVF and 1u PRBC; report of small amount bright red blood from rectum today, H/H holding 10.8/31.8 and BP holding after diuresis    Review of Systems   Constitutional: Negative for chills and fever.        Reports feeling better, feels ready to eat   HENT: Negative for sore throat.    Respiratory: Negative for shortness of breath.    Cardiovascular: Negative for chest pain and leg swelling.   Gastrointestinal: Positive for blood in stool. Negative for abdominal pain, nausea and vomiting.   Genitourinary: Negative.    Musculoskeletal: Negative.    Skin: Negative.    Neurological: Positive for headaches.   Psychiatric/Behavioral: Negative.        Objective:     Vital Signs (Most Recent):  Temp: 99 °F  (37.2 °C) (02/15/19 1127)  Pulse: 100 (02/15/19 1400)  Resp: 16 (02/15/19 1400)  BP: 139/80 (02/15/19 1400)  SpO2: 98 % (02/15/19 1400) Vital Signs (24h Range):  Temp:  [98 °F (36.7 °C)-100.5 °F (38.1 °C)] 99 °F (37.2 °C)  Pulse:  [] 100  Resp:  [8-22] 16  SpO2:  [89 %-100 %] 98 %  BP: ()/(40-85) 139/80     Weight: 62.2 kg (137 lb 2 oz)  Body mass index is 22.82 kg/m².      Intake/Output Summary (Last 24 hours) at 2/15/2019 1429  Last data filed at 2/15/2019 1400  Gross per 24 hour   Intake 3225 ml   Output 2650 ml   Net 575 ml       Physical Exam   Constitutional: She is oriented to person, place, and time. She appears well-developed and well-nourished. She is cooperative. She does not have a sickly appearance. No distress. Nasal cannula in place.   HENT:   Head: Atraumatic.   Eyes: Conjunctivae are normal. Pupils are equal, round, and reactive to light.   Neck: No JVD present. Carotid bruit is not present.   Cardiovascular: Regular rhythm. Tachycardia present.   Murmur heard.   Systolic murmur is present.  Pulses:       Radial pulses are 2+ on the right side, and 2+ on the left side.   Pulmonary/Chest: Effort normal. She has no wheezes. She has no rhonchi. She has no rales.   Abdominal: Soft. She exhibits no distension. There is no tenderness.   Musculoskeletal: She exhibits edema (trace, non pitting).   Neurological: She is alert and oriented to person, place, and time. GCS eye subscore is 4. GCS verbal subscore is 5. GCS motor subscore is 6.   Skin: Skin is warm and dry. Capillary refill takes 2 to 3 seconds. No cyanosis.   Psychiatric: She has a normal mood and affect. Her speech is normal and behavior is normal. Thought content normal. Cognition and memory are normal.       Vents:  Oxygen Concentration (%): 24 (02/14/19 1139)    Lines/Drains/Airways     Peripheral Intravenous Line                 Peripheral IV - Single Lumen 02/13/19 1746 Right Antecubital 1 day         Peripheral IV - Single Lumen  02/14/19 0700 Left Forearm 1 day                Significant Labs:    CBC/Anemia Profile:  Recent Labs   Lab 02/13/19  1742  02/14/19  2010 02/15/19  0244 02/15/19  0632 02/15/19  1020   WBC 6.73  --  5.48  --  5.81  --    HGB 11.0*   < > 7.9*  7.9* 9.3* 9.1* 10.8*   HCT 33.3*   < > 23.3*  23.3* 27.6* 26.3* 31.8*     --  102*  --  94*  --    MCV 94  --  87  --  87  --    RDW 12.5  --  15.5*  --  15.7*  --     < > = values in this interval not displayed.        Chemistries:  Recent Labs   Lab 02/13/19 1742 02/14/19  1318 02/14/19  2010 02/15/19  0632    145 141 142   K 4.4 3.7 3.1* 3.3*    114* 113* 114*   CO2 20* 21* 21* 23   BUN 20 12 9 7*   CREATININE 1.0 0.8 0.7 0.7   CALCIUM 9.4 7.4* 7.6* 7.8*   ALBUMIN 3.8  --   --  2.5*   PROT 6.3  --   --  4.2*   BILITOT 0.4  --   --  0.5   ALKPHOS 60  --   --  34*   ALT 19  --   --  11   AST 39  --   --  33   MG  --   --  1.2* 2.1   PHOS  --   --   --  1.7*       All pertinent labs within the past 24 hours have been reviewed.    Significant Imaging:  I have reviewed all pertinent imaging results/findings within the past 24 hours.        ABG  No results for input(s): PH, PO2, PCO2, HCO3, BE in the last 168 hours.  Assessment/Plan:     Cardiac/Vascular   CAD (coronary artery disease)    With stent, last [facility] 1/2018  Holding antiplatelets due to GI bleed     Renal/   Electrolyte imbalance    Potassium and phos replaced  Repeat level in am to assess response     Oncology   * Acute blood loss anemia    Holding and evidence of bleeding decreased  Repeat h/h in am     Endocrine   Acute hyperglycemia    Stress induced  hgbA1c wnl  resolved     GI   Acute lower GI bleeding    Post polypectomy bleed noted in cecum  Required additional prbc overnight  Evidence of bleeding slowed and h/h holding today  Trial soft diet per GI recs  Anticipate d/c home tomorrow if h/h stable with no further bleeding        Critical Care Daily Checklist:    A: Awake: RASS Goal/Actual  Goal: RASS Goal: -1-->drowsy  Actual: Joel Agitation Sedation Scale (RASS): Alert and calm   B: Spontaneous Breathing Trial Performed?     C: SAT & SBT Coordinated?  n/a                      D: Delirium: CAM-ICU Overall CAM-ICU: Negative   E: Early Mobility Performed? yes   F: Feeding Goal:    Status:     Current Diet Order   Procedures    Diet Dysphagia Mechanical Soft (IDDSI Level 5)      AS: Analgesia/Sedation Tylenol, prn headache   T: Thromboembolic Prophylaxis SCD   H: HOB > 300 Yes   U: Stress Ulcer Prophylaxis (if needed) PPI   G: Glucose Control Controlled    B: Bowel Function Stool Occurrence: 1   I: Indwelling Catheter (Lines & Farrar) Necessity reviewed   D: De-escalation of Antimicrobials/Pharmacotherapies reviewed    Plan for the day/ETD Transfer out of ICU    Code Status:  Family/Goals of Care: Full Code  Anticipate home tomorrow   I have discussed case and plan of care in detail with Dr Mac and Dr Aguilar; Status and plan of care were discussed with team on multidisciplinary rounds.  We will sign off on transfer out of ICU, please call if we can be of any further assistance.     Makenzie Villarreal NP  Critical Care Medicine  [facility] -

## 2019-02-15 NOTE — PROGRESS NOTES
Patient's rhythm changed from ST to A-fib with RVR.  Patient symptomatic with c/o feeling her heart racing.  Call placed to EICU to notify.

## 2019-02-15 NOTE — PROGRESS NOTES
[facility]  Gastroenterology  Progress Note    Patient Name: Tanesha Beard  MRN: [id number]  Admission Date: 2/13/2019  Hospital Length of Stay: 2 days  Code Status: Full Code   Attending Provider: Mary Crowe MD  Consulting Provider: Valentin Cabezas PA-C  Primary Care Physician: Primary Doctor No  Principal Problem: Acute blood loss anemia      Subjective:     Interval History:   The patient is feeling well, but having a little bleeding today. Bright red, low volume. Vitals sable. She received a unit of blood last night after she had Afib with RVR. Hgb from 7.9 to 10.8.     Review of Systems   Constitutional:        See Interval History for daily ROS.      Objective:     Vital Signs (Most Recent):  Temp: 99 °F (37.2 °C) (02/15/19 1127)  Pulse: 99 (02/15/19 1100)  Resp: 16 (02/15/19 1100)  BP: 139/80 (02/15/19 1100)  SpO2: 97 % (02/15/19 1100) Vital Signs (24h Range):  Temp:  [98 °F (36.7 °C)-100.5 °F (38.1 °C)] 99 °F (37.2 °C)  Pulse:  [] 99  Resp:  [8-22] 16  SpO2:  [89 %-100 %] 97 %  BP: ()/(39-83) 139/80     Weight: 62.2 kg (137 lb 2 oz) (02/14/19 0059)  Body mass index is 22.82 kg/m².      Intake/Output Summary (Last 24 hours) at 2/15/2019 1243  Last data filed at 2/15/2019 1105  Gross per 24 hour   Intake 3375 ml   Output 2350 ml   Net 1025 ml       Lines/Drains/Airways     Peripheral Intravenous Line                 Peripheral IV - Single Lumen 02/13/19 1746 Right Antecubital 1 day         Peripheral IV - Single Lumen 02/14/19 0700 Left Forearm 1 day                Physical Exam   Constitutional: She is oriented to person, place, and time. She appears well-developed and well-nourished. No distress.   HENT:   Head: Normocephalic and atraumatic.   Eyes: EOM are normal.   Cardiovascular: Normal rate and regular rhythm.   Pulmonary/Chest: Effort normal and breath sounds normal. No respiratory distress. She has no wheezes.   Abdominal: Soft. Bowel sounds are normal.  She exhibits no distension. There is no tenderness.   Neurological: She is alert and oriented to person, place, and time. No cranial nerve deficit.   Skin: She is not diaphoretic.   Psychiatric: Her behavior is normal.       Significant Labs:  CBC:   Recent Labs   Lab 02/13/19  1742  02/14/19  2010 02/15/19  0244 02/15/19  0632 02/15/19  1020   WBC 6.73  --  5.48  --  5.81  --    HGB 11.0*   < > 7.9*  7.9* 9.3* 9.1* 10.8*   HCT 33.3*   < > 23.3*  23.3* 27.6* 26.3* 31.8*     --  102*  --  94*  --     < > = values in this interval not displayed.     CMP:   Recent Labs   Lab 02/15/19  0632      CALCIUM 7.8*   ALBUMIN 2.5*   PROT 4.2*      K 3.3*   CO2 23   *   BUN 7*   CREATININE 0.7   ALKPHOS 34*   ALT 11   AST 33   BILITOT 0.5     Coagulation:   Recent Labs   Lab 02/13/19  1742   INR 1.0   APTT 21.9         Significant Imaging:  Imaging results within the past 24 hours have been reviewed.    Assessment/Plan:     * Acute blood loss anemia    Improved Hgb with transfusion.   Continue to monitor.      Acute lower GI bleeding    Bleeding was post-polypectomy.   Will discuss with Dr. Aguilar.          Thank you for your consult. I will follow-up with patient. Please contact us if you have any additional questions.    Valentin Cabezas PA-C  Gastroenterology  [facility]

## 2019-02-15 NOTE — PROGRESS NOTES
PRBC x 1 unit, K+30meq, and 4GM Mg+ all infused.  Tolerated well.  At present, has converted to ST with rate 110-120.

## 2019-02-15 NOTE — SUBJECTIVE & OBJECTIVE
Subjective:     Interval History:   The patient is feeling well, but having a little bleeding today. Bright red, low volume. Vitals sable. She received a unit of blood last night after she had Afib with RVR. Hgb from 7.9 to 10.8.     Review of Systems   Constitutional:        See Interval History for daily ROS.      Objective:     Vital Signs (Most Recent):  Temp: 99 °F (37.2 °C) (02/15/19 1127)  Pulse: 99 (02/15/19 1100)  Resp: 16 (02/15/19 1100)  BP: 139/80 (02/15/19 1100)  SpO2: 97 % (02/15/19 1100) Vital Signs (24h Range):  Temp:  [98 °F (36.7 °C)-100.5 °F (38.1 °C)] 99 °F (37.2 °C)  Pulse:  [] 99  Resp:  [8-22] 16  SpO2:  [89 %-100 %] 97 %  BP: ()/(39-83) 139/80     Weight: 62.2 kg (137 lb 2 oz) (02/14/19 0059)  Body mass index is 22.82 kg/m².      Intake/Output Summary (Last 24 hours) at 2/15/2019 1243  Last data filed at 2/15/2019 1105  Gross per 24 hour   Intake 3375 ml   Output 2350 ml   Net 1025 ml       Lines/Drains/Airways     Peripheral Intravenous Line                 Peripheral IV - Single Lumen 02/13/19 1746 Right Antecubital 1 day         Peripheral IV - Single Lumen 02/14/19 0700 Left Forearm 1 day                Physical Exam   Constitutional: She is oriented to person, place, and time. She appears well-developed and well-nourished. No distress.   HENT:   Head: Normocephalic and atraumatic.   Eyes: EOM are normal.   Cardiovascular: Normal rate and regular rhythm.   Pulmonary/Chest: Effort normal and breath sounds normal. No respiratory distress. She has no wheezes.   Abdominal: Soft. Bowel sounds are normal. She exhibits no distension. There is no tenderness.   Neurological: She is alert and oriented to person, place, and time. No cranial nerve deficit.   Skin: She is not diaphoretic.   Psychiatric: Her behavior is normal.       Significant Labs:  CBC:   Recent Labs   Lab 02/13/19  1742  02/14/19  2010 02/15/19  0244 02/15/19  0632 02/15/19  1020   WBC 6.73  --  5.48  --  5.81  --     HGB 11.0*   < > 7.9*  7.9* 9.3* 9.1* 10.8*   HCT 33.3*   < > 23.3*  23.3* 27.6* 26.3* 31.8*     --  102*  --  94*  --     < > = values in this interval not displayed.     CMP:   Recent Labs   Lab 02/15/19  0632      CALCIUM 7.8*   ALBUMIN 2.5*   PROT 4.2*      K 3.3*   CO2 23   *   BUN 7*   CREATININE 0.7   ALKPHOS 34*   ALT 11   AST 33   BILITOT 0.5     Coagulation:   Recent Labs   Lab 02/13/19  1742   INR 1.0   APTT 21.9         Significant Imaging:  Imaging results within the past 24 hours have been reviewed.

## 2019-02-16 VITALS
SYSTOLIC BLOOD PRESSURE: 141 MMHG | WEIGHT: 137.13 LBS | OXYGEN SATURATION: 96 % | HEART RATE: 91 BPM | RESPIRATION RATE: 20 BRPM | BODY MASS INDEX: 22.85 KG/M2 | HEIGHT: 65 IN | DIASTOLIC BLOOD PRESSURE: 79 MMHG | TEMPERATURE: 99 F

## 2019-02-16 LAB
ALBUMIN SERPL BCP-MCNC: 2.8 G/DL
ALP SERPL-CCNC: 41 U/L
ALT SERPL W/O P-5'-P-CCNC: 16 U/L
ANION GAP SERPL CALC-SCNC: 7 MMOL/L
AST SERPL-CCNC: 41 U/L
BASOPHILS # BLD AUTO: 0.02 K/UL
BASOPHILS NFR BLD: 0.4 %
BILIRUB SERPL-MCNC: 0.5 MG/DL
BUN SERPL-MCNC: 4 MG/DL
CALCIUM SERPL-MCNC: 8.7 MG/DL
CHLORIDE SERPL-SCNC: 109 MMOL/L
CO2 SERPL-SCNC: 28 MMOL/L
CREAT SERPL-MCNC: 0.7 MG/DL
DIFFERENTIAL METHOD: ABNORMAL
EOSINOPHIL # BLD AUTO: 0.3 K/UL
EOSINOPHIL NFR BLD: 5.4 %
ERYTHROCYTE [DISTWIDTH] IN BLOOD BY AUTOMATED COUNT: 15.6 %
EST. GFR  (AFRICAN AMERICAN): >60 ML/MIN/1.73 M^2
EST. GFR  (NON AFRICAN AMERICAN): >60 ML/MIN/1.73 M^2
GLUCOSE SERPL-MCNC: 88 MG/DL
HCT VFR BLD AUTO: 28 %
HGB BLD-MCNC: 9.5 G/DL
LYMPHOCYTES # BLD AUTO: 1.3 K/UL
LYMPHOCYTES NFR BLD: 26.7 %
MAGNESIUM SERPL-MCNC: 1.9 MG/DL
MCH RBC QN AUTO: 29.8 PG
MCHC RBC AUTO-ENTMCNC: 33.9 G/DL
MCV RBC AUTO: 88 FL
MONOCYTES # BLD AUTO: 0.7 K/UL
MONOCYTES NFR BLD: 14.4 %
NEUTROPHILS # BLD AUTO: 2.5 K/UL
NEUTROPHILS NFR BLD: 53.1 %
PHOSPHATE SERPL-MCNC: 2.7 MG/DL
PLATELET # BLD AUTO: 106 K/UL
PMV BLD AUTO: 9.9 FL
POTASSIUM SERPL-SCNC: 3.8 MMOL/L
PROT SERPL-MCNC: 4.8 G/DL
RBC # BLD AUTO: 3.19 M/UL
SODIUM SERPL-SCNC: 144 MMOL/L
WBC # BLD AUTO: 4.79 K/UL

## 2019-02-16 PROCEDURE — C9113 INJ PANTOPRAZOLE SODIUM, VIA: HCPCS | Performed by: EMERGENCY MEDICINE

## 2019-02-16 PROCEDURE — 83735 ASSAY OF MAGNESIUM: CPT

## 2019-02-16 PROCEDURE — 80053 COMPREHEN METABOLIC PANEL: CPT

## 2019-02-16 PROCEDURE — 94640 AIRWAY INHALATION TREATMENT: CPT

## 2019-02-16 PROCEDURE — 25000242 PHARM REV CODE 250 ALT 637 W/ HCPCS: Performed by: INTERNAL MEDICINE

## 2019-02-16 PROCEDURE — 85025 COMPLETE CBC W/AUTO DIFF WBC: CPT

## 2019-02-16 PROCEDURE — 36415 COLL VENOUS BLD VENIPUNCTURE: CPT

## 2019-02-16 PROCEDURE — 84100 ASSAY OF PHOSPHORUS: CPT

## 2019-02-16 PROCEDURE — 25000003 PHARM REV CODE 250: Performed by: NURSE PRACTITIONER

## 2019-02-16 PROCEDURE — 63600175 PHARM REV CODE 636 W HCPCS: Performed by: EMERGENCY MEDICINE

## 2019-02-16 RX ORDER — RAMIPRIL 10 MG/1
10 CAPSULE ORAL NIGHTLY
COMMUNITY

## 2019-02-16 RX ADMIN — IPRATROPIUM BROMIDE AND ALBUTEROL SULFATE 3 ML: .5; 3 SOLUTION RESPIRATORY (INHALATION) at 08:02

## 2019-02-16 RX ADMIN — PANTOPRAZOLE SODIUM 40 MG: 40 INJECTION, POWDER, LYOPHILIZED, FOR SOLUTION INTRAVENOUS at 09:02

## 2019-02-16 RX ADMIN — METOPROLOL TARTRATE 25 MG: 25 TABLET ORAL at 09:02

## 2019-02-16 NOTE — NURSING
Pt transferred to room 253. Via wheelchair accompanied by nurse. Pt in NAD. Bedside report given to Daysi. Belongings with pt. Daughters present during transfer

## 2019-02-16 NOTE — DISCHARGE SUMMARY
[facility] Hospital Medicine  Discharge Summary      Patient Name: Tanesha Beard  MRN: [id number]  Admission Date: 2/13/2019  Hospital Length of Stay: 3 days  Discharge Date and Time:  02/16/2019 9:44 AM  Attending Physician: Mary Crowe MD   Discharging Provider: Mary Crowe MD  Primary Care Provider: Primary Doctor No      HPI:   78F h/o CAD s/p LARRY, HTN, and HLD presents with hematochezia.  Large amount maroon colored blood passed in son's driveway and son called 911.  In ER, patient continues to bleed, bright red blood mixed with maroon colored blood clots.  She was type and screened stat.    Hemodynamically stable.  Hb 11 on arrival.  Dr. Cheney (GI) was called in ER and recommended transfusing platelets.    Patient denies any fever, chills, N/V, abd pain, diarrhea, constipation, and all other sxs at this time. Pt had a colonoscopy by general surgery last week and had a few polyps removed. Pt takes ASA and Plavix daily and was not told to discontinue these medications during colonoscopy.  Patient is on Plavix for coronary stent placed in January 2018.  Dr. Rebollar in [place] performed colonoscopy last week.  His cell is [phone].         Procedure(s) (LRB):  COLONOSCOPY (N/A)      Hospital Course:   Pt taken for Colonoscopy which revealed post polypectomy bleed in cecum but insufficient bowel prep did not permit exam of all reported polypectomy sites. Pt given 4 UPRBCS, 3Plts, 1 cryo.   Per GI,     Suspect post polypectomy bleed that started in setting of antiplatelet medications. Patient is hemodynamically stable but has continued hematochezia, so uncertain if hb drop is reflective of previous bleeding vs some ongoing blood loss. Typically these bleeds are self limited. There may be increased risk of ongoing bleeding given her use of Plavix and ASA. Case has been discussed with patient who agrees to proceeding with platelet therapy despite stents given concern for  active bleeding.     With stent, last [facility] 1/2018 2/14: pt now given 4L melina in case colonoscopy needed; need colon prepped in order to perform adequate colonoscopy for bleeding evaluation and management. If pt develops hemodynamic instability or concern for significant on going bleeding despite platelets, recommend additional blood transfusion, STAT CTA, and notify GI in case colonoscopy has to be tried sooner  2/15: Pt now passing small amount of bright red blood. Pt was given 1 U PRBC overnight. GI will continue to monitor. No plans for rescoping yet.  2/16:CBC stable; hemodynamically stable. No overt GI bleeding. Hb stable without any needs for transfusion. Pt denies anymore active bleeding. Tolerates diet. Per GI, ok to resume aspirin, May restart Plavix in 5 days if needed. Seen and examined. Deemed stable to be d/c.     Consults:   Consults (From admission, onward)        Status Ordering Provider     Inpatient consult to Gastroenterology  Once     Provider:  Sheree Cheney MD    Completed WARD JANSEN JR     Inpatient consult to Pulmonology  Once     Provider:  Randolph Fernandez MD    Completed WARD JANSEN JR          No new Assessment & Plan notes have been filed under this hospital service since the last note was generated.  Service: Hospital Medicine    Final Active Diagnoses:    Diagnosis Date Noted POA    PRINCIPAL PROBLEM:  Acute blood loss anemia [D62] 02/14/2019 Yes    Electrolyte imbalance [E87.8] 02/15/2019 Yes    Acute hyperglycemia [R73.9] 02/14/2019 Yes    Acute lower GI bleeding [K92.2] 02/13/2019 Yes    CAD (coronary artery disease) [I25.10] 02/13/2019 Yes      Problems Resolved During this Admission:       Discharged Condition: stable    Disposition: Home or Self Care    Follow Up:  Follow-up Information     Primary Doctor No In 3 days.    Why:  F/U with PCP. May resume plavix in 5 days if needed               Patient Instructions:      Activity as tolerated        Significant Diagnostic Studies: Labs: All labs within the past 24 hours have been reviewed    Pending Diagnostic Studies:     None         Medications:  Reconciled Home Medications:      Medication List      CONTINUE taking these medications    aspirin 81 MG EC tablet  Commonly known as:  ECOTRIN  Take 81 mg by mouth once daily.     metoprolol tartrate 100 MG tablet  Commonly known as:  LOPRESSOR  Take 50 mg by mouth 2 (two) times daily.     ramipril 10 MG capsule  Commonly known as:  ALTACE  Take 10 mg by mouth nightly.     rosuvastatin 20 MG tablet  Commonly known as:  CRESTOR  Take 20 mg by mouth once daily.        STOP taking these medications    clopidogrel 75 mg tablet  Commonly known as:  PLAVIX            Indwelling Lines/Drains at time of discharge:   Lines/Drains/Airways          None          Time spent on the discharge of patient: >30 minutes  Patient was seen and examined on the date of discharge and determined to be suitable for discharge.         Mary Crowe MD  Department of Hospital Medicine  [facility]

## 2019-02-16 NOTE — PROGRESS NOTES
Patient received discharge instructions. Verbalize understanding. Iv access and cardiac monitor removed. Patient wheeled out by Shemar.

## 2019-02-16 NOTE — PLAN OF CARE
Problem: Adult Inpatient Plan of Care  Goal: Plan of Care Review  Outcome: Ongoing (interventions implemented as appropriate)  The patient has been sinus rhythm on the monitor. Pt has had an uneventful night and is resting quietly, will continue to monitor.

## 2019-02-16 NOTE — TELEPHONE ENCOUNTER
Dear Dr. Aguilar,     I am unable to see this pt as post hospital follow up. My next available apt is in 3 months. Please note that I only see patients in consultation and return them to ref providers.  All patients need a primary GI provider that they will return to.  I do not see that she has been seen in clinic yet.      Please have her follow up in general GI clinic, discuss gastroparesis diet, ref to dietitian, start reglan, optimize antiemetics, eliminate any medication that may be contributing to GP symptoms, ect.  Please repeat GES if not done in the past 2 years.       Please ref to me or Dr. Rg if pt still symptomatic with above treatment.      Sincerely,   Dr. Solares

## 2019-02-16 NOTE — TELEPHONE ENCOUNTER
----- Message from Davonte Aguilar MD sent at 2/15/2019  1:14 PM CST -----  Dr. Solares,     This patient has delayed gastric emptying. Can you see her in your clinic once she is discharged from the hospital?    Thanks

## 2019-02-18 ENCOUNTER — PATIENT MESSAGE (OUTPATIENT)
Dept: TRANSPLANT | Facility: CLINIC | Age: 79
End: 2019-02-18

## 2019-02-18 ENCOUNTER — TELEPHONE (OUTPATIENT)
Dept: GASTROENTEROLOGY | Facility: CLINIC | Age: 79
End: 2019-02-18

## 2019-02-18 NOTE — PLAN OF CARE
02/18/19 1629   Final Note   Assessment Type Final Discharge Note   Anticipated Discharge Disposition Home   Discharge plans and expectations educations in teach back method with documentation complete? Yes   Right Care Referral Info   Post Acute Recommendation No Care

## 2019-02-18 NOTE — TELEPHONE ENCOUNTER
----- Message from Gabbie Heller sent at 2/18/2019 10:49 AM CST -----  Contact: Christen daughter  Calling regarding patient being put back on coumadin. States should she resume taking her Plavix and Asprin regimen. Please call Christen @ [phone]. Thanks, bubba

## 2019-02-18 NOTE — TELEPHONE ENCOUNTER
Christen, patient's daughter want to know when to restart ASA and Plavix. Will inform ARLEEN Givens.

## 2019-02-19 ENCOUNTER — PATIENT OUTREACH (OUTPATIENT)
Dept: ADMINISTRATIVE | Facility: CLINIC | Age: 79
End: 2019-02-19

## 2019-02-19 RX ORDER — CLOPIDOGREL BISULFATE 75 MG/1
75 TABLET ORAL DAILY
COMMUNITY

## 2019-02-19 NOTE — TELEPHONE ENCOUNTER
Spoke with patient. She states she will be seeing her local PCP on Thursday. She needs to know if her ASA and plavix can be restarted but now she has been having diarrhea since discharge, 7 bouts of diarrhea yesterday and 1 today, denies blood in stool or dark stools. She would like to know if she can take Imodium. Will inform , she verbalized understanding, ARLEEN.

## 2019-02-19 NOTE — TELEPHONE ENCOUNTER
"----- Message from Beryl Smith RN sent at 2/19/2019  9:59 AM CST -----  Just spoke with Tanesha Beard for a post discharge follow-up call. She was a recent discharge from [facility] for "acute lower GI bleed".  She has been having diarrhea since discharge - 7 bouts of diarrhea yesterday and 1 today.  She denies blood in stool or black dark maroon stools.  She would like to know if she can take Imodium for the diarrhea.  Please return her call ASAP to [phone].  Thanks so much.  Beryl Smith RN  [facility] Call Center  [facility]  "

## 2019-02-19 NOTE — PATIENT INSTRUCTIONS
Lower GI Bleeding (Stable)  You have signs of blood in your stool. This is called rectal bleeding. The bleeding may have begun in another part of your gastrointestinal (GI) tract. If the blood is bright red, it is likely coming from the lower part of the GI tract. If the blood is black or dark, it might be coming from higher up in the GI tract. Very small amounts of GI bleeding may not be visible and can only be discovered during a test on your stool. Possible causes of lower GI bleeding include:  · Hemorrhoids  · Anal fissures  · Diverticulitis  · Inflammatory bowel disease (Crohn's disease or ulcerative colitis)  · Polyps (growths) in the intestine  Note: Iron supplements and medicines for diarrhea or upset stomach can cause black stools. Foods such as licorice and red beets can also discolor the stool and be mistaken for bleeding. These are not bleeding and are not a cause for alarm.  Home care  You have not lost a large amount of blood and your condition appears stable at this time. You may resume normal activity as long as you feel well.  Avoid NSAIDs, such as aspirin, ibuprofen, or naproxen. They can irritate the stomach and cause further bleeding. If you are taking these medicines for other medical reasons, talk to your healthcare provider before you stop them.   Follow-up care  Follow up with your healthcare provider as advised. Further tests may be needed to find the cause of your bleeding.  When to seek medical advice  Call your healthcare provider for any of the following:  · Large amount of rectal bleeding   · Increasing abdominal pain  · Weakness, dizziness  Call 911  Get emergency medical care if any of the following occur:  · Loss of consciousness  · Vomiting blood  Date Last Reviewed: 6/24/2015  © 6443-0335 [employer]. [address] [place] [address]. All rights reserved. This information is not intended as a substitute for professional medical care. Always follow your  healthcare professional's instructions.

## 2019-02-19 NOTE — TELEPHONE ENCOUNTER
Informed patient her records have been faxed to Dr. Adonay Basurto. Spoke with  she states patient needs to check with Dr. Basurto about checking her stool for infection prior to her appointment on Thursday and he can advise her about restarting ASA and Plavix, she verbalized understanding, ARLEEN.

## 2019-02-26 ENCOUNTER — TELEPHONE (OUTPATIENT)
Dept: ENDOSCOPY | Facility: HOSPITAL | Age: 79
End: 2019-02-26

## 2020-10-07 NOTE — PROGRESS NOTES
Dr. Farhat christensen'd in room.  Orders received.   Path Notes (To The Dermatopathologist): Debulking prior to definitive surgical treatment with Mohs surgery. Please evaluate specimen for more deeply invasive disease.